# Patient Record
Sex: FEMALE | Race: WHITE | NOT HISPANIC OR LATINO | Employment: OTHER | URBAN - METROPOLITAN AREA
[De-identification: names, ages, dates, MRNs, and addresses within clinical notes are randomized per-mention and may not be internally consistent; named-entity substitution may affect disease eponyms.]

---

## 2017-01-01 ENCOUNTER — DOCUMENTATION (OUTPATIENT)
Dept: GYNECOLOGIC ONCOLOGY | Facility: CLINIC | Age: 72
End: 2017-01-01

## 2017-01-01 ENCOUNTER — TELEPHONE (OUTPATIENT)
Dept: GYNECOLOGIC ONCOLOGY | Facility: CLINIC | Age: 72
End: 2017-01-01

## 2017-01-01 ENCOUNTER — TELEPHONE (OUTPATIENT)
Dept: RADIATION ONCOLOGY | Facility: HOSPITAL | Age: 72
End: 2017-01-01

## 2017-01-01 ENCOUNTER — OFFICE VISIT (OUTPATIENT)
Dept: GYNECOLOGIC ONCOLOGY | Facility: CLINIC | Age: 72
End: 2017-01-01

## 2017-01-01 ENCOUNTER — HOSPITAL ENCOUNTER (OUTPATIENT)
Dept: GENERAL RADIOLOGY | Facility: HOSPITAL | Age: 72
Discharge: HOME | End: 2017-07-06
Attending: OBSTETRICS & GYNECOLOGY | Admitting: OBSTETRICS & GYNECOLOGY

## 2017-01-01 ENCOUNTER — APPOINTMENT (OUTPATIENT)
Dept: ONCOLOGY | Facility: HOSPITAL | Age: 72
End: 2017-01-01

## 2017-01-01 ENCOUNTER — DOCUMENTATION (OUTPATIENT)
Dept: NUTRITION | Facility: HOSPITAL | Age: 72
End: 2017-01-01

## 2017-01-01 ENCOUNTER — HOSPITAL ENCOUNTER (OUTPATIENT)
Dept: PET IMAGING | Facility: HOSPITAL | Age: 72
Discharge: HOME | End: 2017-04-20

## 2017-01-01 ENCOUNTER — OFFICE VISIT (OUTPATIENT)
Dept: FAMILY MEDICINE CLINIC | Facility: CLINIC | Age: 72
End: 2017-01-01

## 2017-01-01 ENCOUNTER — HOSPITAL ENCOUNTER (OUTPATIENT)
Dept: CT IMAGING | Facility: HOSPITAL | Age: 72
Discharge: HOME | End: 2017-12-28
Attending: OBSTETRICS & GYNECOLOGY | Admitting: OBSTETRICS & GYNECOLOGY

## 2017-01-01 ENCOUNTER — TELEPHONE (OUTPATIENT)
Dept: FAMILY MEDICINE CLINIC | Facility: CLINIC | Age: 72
End: 2017-01-01

## 2017-01-01 ENCOUNTER — LAB (OUTPATIENT)
Dept: LAB | Facility: HOSPITAL | Age: 72
End: 2017-01-01

## 2017-01-01 ENCOUNTER — INFUSION (OUTPATIENT)
Dept: ONCOLOGY | Facility: HOSPITAL | Age: 72
End: 2017-01-01

## 2017-01-01 ENCOUNTER — HOSPITAL ENCOUNTER (INPATIENT)
Facility: HOSPITAL | Age: 72
LOS: 3 days | Discharge: HOME | End: 2017-06-26
Attending: OBSTETRICS & GYNECOLOGY | Admitting: OBSTETRICS & GYNECOLOGY

## 2017-01-01 ENCOUNTER — HOSPITAL ENCOUNTER (OUTPATIENT)
Dept: CT IMAGING | Facility: HOSPITAL | Age: 72
Discharge: HOME | End: 2017-06-23
Attending: OBSTETRICS & GYNECOLOGY

## 2017-01-01 ENCOUNTER — HOSPITAL ENCOUNTER (OUTPATIENT)
Dept: CT IMAGING | Facility: HOSPITAL | Age: 72
Discharge: HOME | End: 2017-04-13
Admitting: NURSE PRACTITIONER

## 2017-01-01 ENCOUNTER — HOSPITAL ENCOUNTER (OUTPATIENT)
Dept: PET IMAGING | Facility: HOSPITAL | Age: 72
Discharge: HOME | End: 2017-04-20
Admitting: NURSE PRACTITIONER

## 2017-01-01 ENCOUNTER — HOSPITAL ENCOUNTER (OUTPATIENT)
Dept: RADIATION ONCOLOGY | Facility: HOSPITAL | Age: 72
Setting detail: RADIATION/ONCOLOGY SERIES
Discharge: HOME | End: 2017-04-26

## 2017-01-01 ENCOUNTER — OFFICE VISIT (OUTPATIENT)
Dept: RADIATION ONCOLOGY | Facility: HOSPITAL | Age: 72
End: 2017-01-01

## 2017-01-01 ENCOUNTER — APPOINTMENT (OUTPATIENT)
Dept: CT IMAGING | Facility: HOSPITAL | Age: 72
End: 2017-01-01

## 2017-01-01 VITALS
OXYGEN SATURATION: 90 % | DIASTOLIC BLOOD PRESSURE: 71 MMHG | RESPIRATION RATE: 20 BRPM | HEART RATE: 92 BPM | WEIGHT: 206 LBS | SYSTOLIC BLOOD PRESSURE: 119 MMHG | BODY MASS INDEX: 33.25 KG/M2 | TEMPERATURE: 97.8 F

## 2017-01-01 VITALS
BODY MASS INDEX: 31.47 KG/M2 | WEIGHT: 195 LBS | TEMPERATURE: 98.1 F | RESPIRATION RATE: 20 BRPM | DIASTOLIC BLOOD PRESSURE: 59 MMHG | OXYGEN SATURATION: 93 % | SYSTOLIC BLOOD PRESSURE: 115 MMHG | HEART RATE: 100 BPM

## 2017-01-01 VITALS
TEMPERATURE: 97.6 F | BODY MASS INDEX: 32.88 KG/M2 | HEART RATE: 72 BPM | HEIGHT: 66 IN | SYSTOLIC BLOOD PRESSURE: 121 MMHG | DIASTOLIC BLOOD PRESSURE: 65 MMHG | RESPIRATION RATE: 16 BRPM | OXYGEN SATURATION: 86 % | WEIGHT: 204.6 LBS

## 2017-01-01 VITALS — OXYGEN SATURATION: 96 %

## 2017-01-01 VITALS
OXYGEN SATURATION: 96 % | SYSTOLIC BLOOD PRESSURE: 99 MMHG | RESPIRATION RATE: 18 BRPM | TEMPERATURE: 98 F | HEIGHT: 66 IN | DIASTOLIC BLOOD PRESSURE: 54 MMHG | WEIGHT: 215 LBS | HEART RATE: 71 BPM | BODY MASS INDEX: 34.55 KG/M2

## 2017-01-01 VITALS
HEART RATE: 82 BPM | TEMPERATURE: 97.2 F | BODY MASS INDEX: 35.45 KG/M2 | RESPIRATION RATE: 20 BRPM | DIASTOLIC BLOOD PRESSURE: 62 MMHG | WEIGHT: 213 LBS | OXYGEN SATURATION: 90 % | SYSTOLIC BLOOD PRESSURE: 131 MMHG

## 2017-01-01 VITALS
TEMPERATURE: 97.6 F | RESPIRATION RATE: 16 BRPM | SYSTOLIC BLOOD PRESSURE: 145 MMHG | DIASTOLIC BLOOD PRESSURE: 64 MMHG | HEIGHT: 65 IN | BODY MASS INDEX: 36.15 KG/M2 | OXYGEN SATURATION: 89 % | WEIGHT: 217 LBS | HEART RATE: 77 BPM

## 2017-01-01 VITALS
RESPIRATION RATE: 20 BRPM | TEMPERATURE: 96.6 F | SYSTOLIC BLOOD PRESSURE: 156 MMHG | HEART RATE: 79 BPM | WEIGHT: 217 LBS | DIASTOLIC BLOOD PRESSURE: 71 MMHG | OXYGEN SATURATION: 92 % | BODY MASS INDEX: 36.11 KG/M2

## 2017-01-01 VITALS
DIASTOLIC BLOOD PRESSURE: 63 MMHG | HEIGHT: 65 IN | RESPIRATION RATE: 18 BRPM | BODY MASS INDEX: 36.49 KG/M2 | SYSTOLIC BLOOD PRESSURE: 119 MMHG | OXYGEN SATURATION: 96 % | HEART RATE: 72 BPM | TEMPERATURE: 97 F | WEIGHT: 219 LBS

## 2017-01-01 VITALS
SYSTOLIC BLOOD PRESSURE: 136 MMHG | BODY MASS INDEX: 36.61 KG/M2 | DIASTOLIC BLOOD PRESSURE: 73 MMHG | TEMPERATURE: 96.8 F | OXYGEN SATURATION: 90 % | RESPIRATION RATE: 26 BRPM | HEART RATE: 95 BPM | WEIGHT: 220 LBS

## 2017-01-01 VITALS
SYSTOLIC BLOOD PRESSURE: 122 MMHG | HEIGHT: 65 IN | BODY MASS INDEX: 36.32 KG/M2 | WEIGHT: 218 LBS | TEMPERATURE: 97.9 F | DIASTOLIC BLOOD PRESSURE: 84 MMHG | OXYGEN SATURATION: 95 % | HEART RATE: 81 BPM

## 2017-01-01 VITALS
OXYGEN SATURATION: 84 % | SYSTOLIC BLOOD PRESSURE: 111 MMHG | TEMPERATURE: 97.7 F | RESPIRATION RATE: 32 BRPM | HEART RATE: 93 BPM | DIASTOLIC BLOOD PRESSURE: 63 MMHG

## 2017-01-01 VITALS
WEIGHT: 205 LBS | SYSTOLIC BLOOD PRESSURE: 126 MMHG | DIASTOLIC BLOOD PRESSURE: 65 MMHG | HEART RATE: 92 BPM | TEMPERATURE: 98 F | HEIGHT: 66 IN | BODY MASS INDEX: 32.95 KG/M2 | RESPIRATION RATE: 20 BRPM

## 2017-01-01 VITALS
HEART RATE: 94 BPM | OXYGEN SATURATION: 82 % | RESPIRATION RATE: 20 BRPM | WEIGHT: 215 LBS | TEMPERATURE: 96.6 F | SYSTOLIC BLOOD PRESSURE: 129 MMHG | DIASTOLIC BLOOD PRESSURE: 66 MMHG | BODY MASS INDEX: 35.78 KG/M2

## 2017-01-01 VITALS
TEMPERATURE: 97.1 F | HEIGHT: 65 IN | SYSTOLIC BLOOD PRESSURE: 138 MMHG | DIASTOLIC BLOOD PRESSURE: 67 MMHG | OXYGEN SATURATION: 83 % | WEIGHT: 215 LBS | HEART RATE: 90 BPM | RESPIRATION RATE: 20 BRPM | BODY MASS INDEX: 35.82 KG/M2

## 2017-01-01 VITALS
BODY MASS INDEX: 35.82 KG/M2 | HEART RATE: 69 BPM | HEIGHT: 65 IN | WEIGHT: 215 LBS | RESPIRATION RATE: 18 BRPM | DIASTOLIC BLOOD PRESSURE: 58 MMHG | TEMPERATURE: 99.7 F | SYSTOLIC BLOOD PRESSURE: 119 MMHG

## 2017-01-01 DIAGNOSIS — C78.00 MALIGNANT NEOPLASM METASTATIC TO LUNG, UNSPECIFIED LATERALITY (HCC): ICD-10-CM

## 2017-01-01 DIAGNOSIS — Z99.81 REQUIRES CONTINUOUS AT HOME SUPPLEMENTAL OXYGEN: ICD-10-CM

## 2017-01-01 DIAGNOSIS — J02.9 SORE THROAT: ICD-10-CM

## 2017-01-01 DIAGNOSIS — J43.1 PANLOBULAR EMPHYSEMA (HCC): ICD-10-CM

## 2017-01-01 DIAGNOSIS — R05.9 COUGH: ICD-10-CM

## 2017-01-01 DIAGNOSIS — C56.3 MALIGNANT NEOPLASM OF BOTH OVARIES (HCC): ICD-10-CM

## 2017-01-01 DIAGNOSIS — C78.02 MALIGNANT NEOPLASM METASTATIC TO LEFT LUNG (HCC): ICD-10-CM

## 2017-01-01 DIAGNOSIS — C56.9 OVARIAN CANCER, UNSPECIFIED LATERALITY (HCC): Primary | ICD-10-CM

## 2017-01-01 DIAGNOSIS — R82.90 ABNORMAL URINALYSIS: ICD-10-CM

## 2017-01-01 DIAGNOSIS — R11.0 NAUSEA: Primary | ICD-10-CM

## 2017-01-01 DIAGNOSIS — F32.9 REACTIVE DEPRESSION: ICD-10-CM

## 2017-01-01 DIAGNOSIS — R39.89 URINARY PROBLEM: ICD-10-CM

## 2017-01-01 DIAGNOSIS — C78.02 MALIGNANT NEOPLASM METASTATIC TO LEFT LUNG (HCC): Primary | ICD-10-CM

## 2017-01-01 DIAGNOSIS — H61.22 IMPACTED CERUMEN OF LEFT EAR: ICD-10-CM

## 2017-01-01 DIAGNOSIS — K59.03 DRUG-INDUCED CONSTIPATION: ICD-10-CM

## 2017-01-01 DIAGNOSIS — J98.4 CAVITATING MASS OF UPPER LOBE OF LEFT LUNG: ICD-10-CM

## 2017-01-01 DIAGNOSIS — R09.02 HYPOXIA: Primary | ICD-10-CM

## 2017-01-01 DIAGNOSIS — I26.09 OTHER CHRONIC PULMONARY EMBOLISM WITH ACUTE COR PULMONALE (HCC): Primary | ICD-10-CM

## 2017-01-01 DIAGNOSIS — Z23 FLU VACCINE NEED: ICD-10-CM

## 2017-01-01 DIAGNOSIS — Z23 FLU VACCINE NEED: Primary | ICD-10-CM

## 2017-01-01 DIAGNOSIS — Z79.899 ON ANTINEOPLASTIC CHEMOTHERAPY: ICD-10-CM

## 2017-01-01 DIAGNOSIS — R09.02 HYPOXIA: ICD-10-CM

## 2017-01-01 DIAGNOSIS — C56.9 OVARIAN CANCER, UNSPECIFIED LATERALITY (HCC): ICD-10-CM

## 2017-01-01 DIAGNOSIS — C56.3 MALIGNANT NEOPLASM OF BOTH OVARIES (HCC): Primary | ICD-10-CM

## 2017-01-01 DIAGNOSIS — C56.9 MALIGNANT NEOPLASM OF OVARY, UNSPECIFIED LATERALITY (HCC): ICD-10-CM

## 2017-01-01 DIAGNOSIS — J43.1 PANLOBULAR EMPHYSEMA (HCC): Primary | ICD-10-CM

## 2017-01-01 DIAGNOSIS — R11.0 CHEMOTHERAPY-INDUCED NAUSEA: Primary | ICD-10-CM

## 2017-01-01 DIAGNOSIS — J44.9 CHRONIC OBSTRUCTIVE PULMONARY DISEASE, UNSPECIFIED COPD TYPE (HCC): ICD-10-CM

## 2017-01-01 DIAGNOSIS — L65.8 ALOPECIA DUE TO CYTOTOXIC DRUG: ICD-10-CM

## 2017-01-01 DIAGNOSIS — R53.0 NEOPLASTIC MALIGNANT RELATED FATIGUE: ICD-10-CM

## 2017-01-01 DIAGNOSIS — C56.9 MALIGNANT NEOPLASM OF OVARY, UNSPECIFIED LATERALITY (HCC): Primary | ICD-10-CM

## 2017-01-01 DIAGNOSIS — J02.9 SORE THROAT: Primary | ICD-10-CM

## 2017-01-01 DIAGNOSIS — Z79.01 ANTICOAGULATED BY ANTICOAGULATION TREATMENT: ICD-10-CM

## 2017-01-01 DIAGNOSIS — T45.1X5A PANCYTOPENIA DUE TO ANTINEOPLASTIC CHEMOTHERAPY (HCC): ICD-10-CM

## 2017-01-01 DIAGNOSIS — D61.810 PANCYTOPENIA DUE TO ANTINEOPLASTIC CHEMOTHERAPY (HCC): ICD-10-CM

## 2017-01-01 DIAGNOSIS — R82.90 ABNORMAL URINALYSIS: Primary | ICD-10-CM

## 2017-01-01 DIAGNOSIS — R06.00 DYSPNEA, UNSPECIFIED TYPE: ICD-10-CM

## 2017-01-01 DIAGNOSIS — E78.5 DYSLIPIDEMIA: ICD-10-CM

## 2017-01-01 DIAGNOSIS — T45.1X5A ALOPECIA DUE TO CYTOTOXIC DRUG: ICD-10-CM

## 2017-01-01 DIAGNOSIS — I26.09 OTHER CHRONIC PULMONARY EMBOLISM WITH ACUTE COR PULMONALE (HCC): ICD-10-CM

## 2017-01-01 DIAGNOSIS — G50.0 TRIGEMINAL NEURALGIA OF RIGHT SIDE OF FACE: Primary | ICD-10-CM

## 2017-01-01 DIAGNOSIS — R06.02 SHORTNESS OF BREATH: ICD-10-CM

## 2017-01-01 DIAGNOSIS — J44.9 CHRONIC OBSTRUCTIVE PULMONARY DISEASE, UNSPECIFIED COPD TYPE (HCC): Primary | ICD-10-CM
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
## 2017-01-01 DIAGNOSIS — M81.0 OSTEOPOROSIS: ICD-10-CM

## 2017-01-01 DIAGNOSIS — I27.82 OTHER CHRONIC PULMONARY EMBOLISM WITH ACUTE COR PULMONALE (HCC): Primary | ICD-10-CM

## 2017-01-01 DIAGNOSIS — I82.593 CHRONIC DEEP VEIN THROMBOSIS (DVT) OF OTHER VEIN OF BOTH LOWER EXTREMITIES (HCC): ICD-10-CM

## 2017-01-01 DIAGNOSIS — G50.0 TRIGEMINAL NEURALGIA: Primary | ICD-10-CM

## 2017-01-01 DIAGNOSIS — G50.0 TRIGEMINAL NEURALGIA OF RIGHT SIDE OF FACE: ICD-10-CM

## 2017-01-01 DIAGNOSIS — I27.82 OTHER CHRONIC PULMONARY EMBOLISM WITH ACUTE COR PULMONALE (HCC): ICD-10-CM

## 2017-01-01 DIAGNOSIS — B00.2 ORAL HERPES: Primary | ICD-10-CM

## 2017-01-01 DIAGNOSIS — R30.0 DYSURIA: Primary | ICD-10-CM
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
## 2017-01-01 DIAGNOSIS — R06.09 DYSPNEA ON EXERTION: ICD-10-CM

## 2017-01-01 DIAGNOSIS — E86.0 DEHYDRATION: ICD-10-CM

## 2017-01-01 DIAGNOSIS — K62.5 RECTAL BLEEDING: ICD-10-CM

## 2017-01-01 DIAGNOSIS — T45.1X5A CHEMOTHERAPY-INDUCED NAUSEA: Primary | ICD-10-CM

## 2017-01-01 DIAGNOSIS — R58 ECCHYMOSIS ON EXAMINATION: ICD-10-CM

## 2017-01-01 LAB
ABO + RH BLD: NORMAL
ABO + RH BLD: NORMAL
ABO GROUP BLD: NORMAL
ALBUMIN SERPL-MCNC: 3.9 G/DL (ref 3.2–4.8)
ALBUMIN SERPL-MCNC: 4 G/DL (ref 3.2–4.8)
ALBUMIN SERPL-MCNC: 4.2 G/DL (ref 3.2–4.8)
ALBUMIN SERPL-MCNC: 4.4 G/DL (ref 3.2–4.8)
ALBUMIN/GLOB SERPL: 1.3 G/DL (ref 1.5–2.5)
ALBUMIN/GLOB SERPL: 1.4 G/DL (ref 1.5–2.5)
ALP SERPL-CCNC: 69 U/L (ref 25–100)
ALP SERPL-CCNC: 73 U/L (ref 25–100)
ALP SERPL-CCNC: 81 U/L (ref 25–100)
ALP SERPL-CCNC: 85 U/L (ref 25–100)
ALT SERPL W P-5'-P-CCNC: 12 U/L (ref 7–40)
ALT SERPL W P-5'-P-CCNC: 17 U/L (ref 7–40)
ALT SERPL W P-5'-P-CCNC: 18 U/L (ref 7–40)
ALT SERPL W P-5'-P-CCNC: 20 U/L (ref 7–40)
ANION GAP SERPL CALCULATED.3IONS-SCNC: 3 MMOL/L (ref 3–11)
ANION GAP SERPL CALCULATED.3IONS-SCNC: 4 MMOL/L (ref 3–11)
ANION GAP SERPL CALCULATED.3IONS-SCNC: 4 MMOL/L (ref 3–11)
ANION GAP SERPL CALCULATED.3IONS-SCNC: 7 MMOL/L (ref 3–11)
ANION GAP SERPL CALCULATED.3IONS-SCNC: 8 MMOL/L (ref 3–11)
ANION GAP SERPL CALCULATED.3IONS-SCNC: 8 MMOL/L (ref 3–11)
AST SERPL-CCNC: 13 U/L (ref 0–33)
AST SERPL-CCNC: 20 U/L (ref 0–33)
AST SERPL-CCNC: 21 U/L (ref 0–33)
AST SERPL-CCNC: 24 U/L (ref 0–33)
BACTERIA SPEC AEROBE CULT: ABNORMAL
BACTERIA SPEC AEROBE CULT: NORMAL
BACTERIA UR QL AUTO: ABNORMAL /HPF
BASOPHILS # BLD AUTO: 0 10*3/MM3 (ref 0–0.2)
BASOPHILS # BLD AUTO: 0.01 10*3/MM3 (ref 0–0.2)
BASOPHILS # BLD MANUAL: 0 10*3/MM3 (ref 0–0.2)
BASOPHILS NFR BLD AUTO: 0 % (ref 0–1)
BASOPHILS NFR BLD AUTO: 0 % (ref 0–1)
BASOPHILS NFR BLD AUTO: 0.2 % (ref 0–1)
BH BB BLOOD EXPIRATION DATE: NORMAL
BH BB BLOOD EXPIRATION DATE: NORMAL
BH BB BLOOD TYPE BARCODE: 6200
BH BB BLOOD TYPE BARCODE: 6200
BH BB DISPENSE STATUS: NORMAL
BH BB DISPENSE STATUS: NORMAL
BH BB PRODUCT CODE: NORMAL
BH BB PRODUCT CODE: NORMAL
BH BB UNIT NUMBER: NORMAL
BH BB UNIT NUMBER: NORMAL
BILIRUB SERPL-MCNC: 0.2 MG/DL (ref 0.3–1.2)
BILIRUB SERPL-MCNC: 0.3 MG/DL (ref 0.3–1.2)
BILIRUB SERPL-MCNC: 0.3 MG/DL (ref 0.3–1.2)
BILIRUB SERPL-MCNC: 0.6 MG/DL (ref 0.3–1.2)
BILIRUB UR QL STRIP: NEGATIVE
BLD GP AB SCN SERPL QL: NEGATIVE
BUN BLD-MCNC: 14 MG/DL (ref 9–23)
BUN BLD-MCNC: 14 MG/DL (ref 9–23)
BUN BLD-MCNC: 17 MG/DL (ref 9–23)
BUN BLD-MCNC: 22 MG/DL (ref 9–23)
BUN BLD-MCNC: 27 MG/DL (ref 9–23)
BUN BLD-MCNC: 33 MG/DL (ref 9–23)
BUN/CREAT SERPL: 17.5 (ref 7–25)
BUN/CREAT SERPL: 18.9 (ref 7–25)
BUN/CREAT SERPL: 20 (ref 7–25)
BUN/CREAT SERPL: 27.5 (ref 7–25)
BUN/CREAT SERPL: 30 (ref 7–25)
BUN/CREAT SERPL: 36.7 (ref 7–25)
CA-I SERPL ISE-MCNC: 1.14 MMOL/L (ref 1.12–1.32)
CALCIUM SPEC-SCNC: 10 MG/DL (ref 8.7–10.4)
CALCIUM SPEC-SCNC: 8.5 MG/DL (ref 8.7–10.4)
CALCIUM SPEC-SCNC: 8.6 MG/DL (ref 8.7–10.4)
CALCIUM SPEC-SCNC: 9.3 MG/DL (ref 8.7–10.4)
CALCIUM SPEC-SCNC: 9.5 MG/DL (ref 8.7–10.4)
CALCIUM SPEC-SCNC: 9.8 MG/DL (ref 8.7–10.4)
CANCER AG125 SERPL QL: 21.8 U/ML (ref 0–30.2)
CANCER AG125 SERPL QL: 24.9 U/ML (ref 0–30.2)
CANCER AG125 SERPL QL: 28.6 U/ML (ref 0–30.2)
CHLORIDE SERPL-SCNC: 103 MMOL/L (ref 99–109)
CHLORIDE SERPL-SCNC: 105 MMOL/L (ref 99–109)
CHLORIDE SERPL-SCNC: 106 MMOL/L (ref 99–109)
CHLORIDE SERPL-SCNC: 107 MMOL/L (ref 99–109)
CLARITY UR: ABNORMAL
CLARITY UR: CLEAR
CO2 SERPL-SCNC: 24 MMOL/L (ref 20–31)
CO2 SERPL-SCNC: 25 MMOL/L (ref 20–31)
CO2 SERPL-SCNC: 26 MMOL/L (ref 20–31)
CO2 SERPL-SCNC: 30 MMOL/L (ref 20–31)
CO2 SERPL-SCNC: 30 MMOL/L (ref 20–31)
CO2 SERPL-SCNC: 31 MMOL/L (ref 20–31)
COD CRY URNS QL: ABNORMAL /HPF
COLOR UR: YELLOW
CREAT BLD-MCNC: 0.7 MG/DL (ref 0.6–1.3)
CREAT BLD-MCNC: 0.8 MG/DL (ref 0.6–1.3)
CREAT BLD-MCNC: 0.8 MG/DL (ref 0.6–1.3)
CREAT BLD-MCNC: 0.9 MG/DL (ref 0.6–1.3)
CREAT BLDA-MCNC: 0.7 MG/DL (ref 0.6–1.3)
CREAT BLDA-MCNC: 0.8 MG/DL (ref 0.6–1.3)
CREAT BLDA-MCNC: 0.9 MG/DL (ref 0.6–1.3)
CREAT SERPL-MCNC: 0.8 MG/DL
CROSSMATCH INTERPRETATION: NORMAL
CROSSMATCH INTERPRETATION: NORMAL
DEPRECATED RDW RBC AUTO: 69 FL (ref 37–54)
DEPRECATED RDW RBC AUTO: 69.7 FL (ref 37–54)
DEPRECATED RDW RBC AUTO: 70.5 FL (ref 37–54)
EOSINOPHIL # BLD AUTO: 0.01 10*3/MM3 (ref 0.1–0.3)
EOSINOPHIL # BLD AUTO: 0.01 10*3/MM3 (ref 0.1–0.3)
EOSINOPHIL # BLD MANUAL: 0.02 10*3/MM3 (ref 0.1–0.3)
EOSINOPHIL NFR BLD AUTO: 0.2 % (ref 0–3)
EOSINOPHIL NFR BLD AUTO: 0.9 % (ref 0–3)
EOSINOPHIL NFR BLD MANUAL: 1 % (ref 0–3)
ERYTHROCYTE [DISTWIDTH] IN BLOOD BY AUTOMATED COUNT: 16.3 % (ref 11.3–14.5)
ERYTHROCYTE [DISTWIDTH] IN BLOOD BY AUTOMATED COUNT: 17.1 % (ref 11.3–14.5)
ERYTHROCYTE [DISTWIDTH] IN BLOOD BY AUTOMATED COUNT: 19.6 % (ref 11.3–14.5)
ERYTHROCYTE [DISTWIDTH] IN BLOOD BY AUTOMATED COUNT: 19.7 % (ref 11.3–14.5)
ERYTHROCYTE [DISTWIDTH] IN BLOOD BY AUTOMATED COUNT: 19.9 % (ref 11.3–14.5)
ERYTHROCYTE [DISTWIDTH] IN BLOOD BY AUTOMATED COUNT: 20.1 % (ref 11.3–14.5)
ERYTHROCYTE [DISTWIDTH] IN BLOOD BY AUTOMATED COUNT: 21.5 % (ref 11.3–14.5)
ERYTHROCYTE [DISTWIDTH] IN BLOOD BY AUTOMATED COUNT: 23.3 % (ref 11.3–14.5)
GFR SERPL CREATININE-BSD FRML MDRD: 61 ML/MIN/1.73
GFR SERPL CREATININE-BSD FRML MDRD: 70 ML/MIN/1.73
GFR SERPL CREATININE-BSD FRML MDRD: 70 ML/MIN/1.73
GFR SERPL CREATININE-BSD FRML MDRD: 82 ML/MIN/1.73
GLOBULIN UR ELPH-MCNC: 2.9 GM/DL
GLOBULIN UR ELPH-MCNC: 2.9 GM/DL
GLOBULIN UR ELPH-MCNC: 3.1 GM/DL
GLOBULIN UR ELPH-MCNC: 3.2 GM/DL
GLUCOSE BLD-MCNC: 119 MG/DL (ref 70–100)
GLUCOSE BLD-MCNC: 125 MG/DL (ref 70–100)
GLUCOSE BLD-MCNC: 127 MG/DL (ref 70–100)
GLUCOSE BLD-MCNC: 92 MG/DL (ref 70–100)
GLUCOSE BLD-MCNC: 96 MG/DL (ref 70–100)
GLUCOSE BLD-MCNC: 98 MG/DL (ref 70–100)
GLUCOSE BLDC GLUCOMTR-MCNC: 108 MG/DL (ref 70–130)
GLUCOSE BLDC GLUCOMTR-MCNC: 150 MG/DL (ref 70–130)
GLUCOSE BLDC GLUCOMTR-MCNC: 87 MG/DL (ref 70–130)
GLUCOSE UR STRIP-MCNC: NEGATIVE MG/DL
HCT VFR BLD AUTO: 22.3 % (ref 34.5–44)
HCT VFR BLD AUTO: 27.3 % (ref 34.5–44)
HCT VFR BLD AUTO: 27.7 % (ref 34.5–44)
HCT VFR BLD AUTO: 27.8 % (ref 34.5–44)
HCT VFR BLD AUTO: 28.4 % (ref 34.5–44)
HCT VFR BLD AUTO: 31 % (ref 34.5–44)
HCT VFR BLD AUTO: 33 % (ref 34.5–44)
HCT VFR BLD AUTO: 37 % (ref 34.5–44)
HGB BLD-MCNC: 10.8 G/DL (ref 11.5–15.5)
HGB BLD-MCNC: 12.1 G/DL (ref 11.5–15.5)
HGB BLD-MCNC: 7.4 G/DL (ref 11.5–15.5)
HGB BLD-MCNC: 8.6 G/DL (ref 11.5–15.5)
HGB BLD-MCNC: 8.9 G/DL (ref 11.5–15.5)
HGB BLD-MCNC: 8.9 G/DL (ref 11.5–15.5)
HGB BLD-MCNC: 9.2 G/DL (ref 11.5–15.5)
HGB BLD-MCNC: 9.8 G/DL (ref 11.5–15.5)
HGB UR QL STRIP.AUTO: ABNORMAL
HGB UR QL STRIP.AUTO: NEGATIVE
HYALINE CASTS UR QL AUTO: ABNORMAL /LPF
IMM GRANULOCYTES # BLD: 0 10*3/MM3 (ref 0–0.03)
IMM GRANULOCYTES # BLD: 0.03 10*3/MM3 (ref 0–0.03)
IMM GRANULOCYTES NFR BLD: 0 % (ref 0–0.6)
IMM GRANULOCYTES NFR BLD: 0.6 % (ref 0–0.6)
KETONES UR QL STRIP: ABNORMAL
KETONES UR QL STRIP: NEGATIVE
LEUKOCYTE ESTERASE UR QL STRIP.AUTO: ABNORMAL
LEUKOCYTE ESTERASE UR QL STRIP.AUTO: NEGATIVE
LEUKOCYTE ESTERASE UR QL STRIP.AUTO: NEGATIVE
LYMPHOCYTES # BLD AUTO: 0.76 10*3/MM3 (ref 0.6–4.8)
LYMPHOCYTES # BLD AUTO: 0.8 10*3/MM3 (ref 0.6–4.8)
LYMPHOCYTES # BLD AUTO: 0.9 10*3/MM3 (ref 0.6–4.8)
LYMPHOCYTES # BLD AUTO: 1 10*3/MM3 (ref 0.6–4.8)
LYMPHOCYTES # BLD AUTO: 1.09 10*3/MM3 (ref 0.6–4.8)
LYMPHOCYTES # BLD AUTO: 1.4 10*3/MM3 (ref 0.6–4.8)
LYMPHOCYTES # BLD AUTO: 1.7 10*3/MM3 (ref 0.6–4.8)
LYMPHOCYTES # BLD MANUAL: 1.05 10*3/MM3 (ref 0.6–4.8)
LYMPHOCYTES NFR BLD AUTO: 13.8 % (ref 24–44)
LYMPHOCYTES NFR BLD AUTO: 14.7 % (ref 24–44)
LYMPHOCYTES NFR BLD AUTO: 14.7 % (ref 24–44)
LYMPHOCYTES NFR BLD AUTO: 22.1 % (ref 24–44)
LYMPHOCYTES NFR BLD AUTO: 27.1 % (ref 24–44)
LYMPHOCYTES NFR BLD AUTO: 71.7 % (ref 24–44)
LYMPHOCYTES NFR BLD AUTO: 75.7 % (ref 24–44)
LYMPHOCYTES NFR BLD MANUAL: 31 % (ref 0–12)
LYMPHOCYTES NFR BLD MANUAL: 44 % (ref 24–44)
MACROCYTES BLD QL SMEAR: ABNORMAL
MACROCYTES BLD QL SMEAR: NORMAL
MACROCYTES BLD QL SMEAR: NORMAL
MAGNESIUM SERPL-MCNC: 1.5 MG/DL (ref 1.3–2.7)
MAGNESIUM SERPL-MCNC: 1.7 MG/DL (ref 1.3–2.7)
MAGNESIUM SERPL-MCNC: 2 MG/DL (ref 1.3–2.7)
MCH RBC QN AUTO: 31.6 PG (ref 27–31)
MCH RBC QN AUTO: 31.8 PG (ref 27–31)
MCH RBC QN AUTO: 31.9 PG (ref 27–31)
MCH RBC QN AUTO: 31.9 PG (ref 27–31)
MCH RBC QN AUTO: 32 PG (ref 27–31)
MCH RBC QN AUTO: 32.2 PG (ref 27–31)
MCH RBC QN AUTO: 32.2 PG (ref 27–31)
MCH RBC QN AUTO: 32.6 PG (ref 27–31)
MCHC RBC AUTO-ENTMCNC: 31.5 G/DL (ref 32–36)
MCHC RBC AUTO-ENTMCNC: 31.8 G/DL (ref 32–36)
MCHC RBC AUTO-ENTMCNC: 32 G/DL (ref 32–36)
MCHC RBC AUTO-ENTMCNC: 32.1 G/DL (ref 32–36)
MCHC RBC AUTO-ENTMCNC: 32.2 G/DL (ref 32–36)
MCHC RBC AUTO-ENTMCNC: 32.6 G/DL (ref 32–36)
MCHC RBC AUTO-ENTMCNC: 32.9 G/DL (ref 32–36)
MCHC RBC AUTO-ENTMCNC: 33.3 G/DL (ref 32–36)
MCV RBC AUTO: 100 FL (ref 80–99)
MCV RBC AUTO: 100 FL (ref 80–99)
MCV RBC AUTO: 100.4 FL (ref 80–99)
MCV RBC AUTO: 101.1 FL (ref 80–99)
MCV RBC AUTO: 97 FL (ref 80–99)
MCV RBC AUTO: 97.1 FL (ref 80–99)
MCV RBC AUTO: 97.8 FL (ref 80–99)
MCV RBC AUTO: 99.7 FL (ref 80–99)
MONOCYTES # BLD AUTO: 0.1 10*3/MM3 (ref 0–1)
MONOCYTES # BLD AUTO: 0.2 10*3/MM3 (ref 0–1)
MONOCYTES # BLD AUTO: 0.2 10*3/MM3 (ref 0–1)
MONOCYTES # BLD AUTO: 0.28 10*3/MM3 (ref 0–1)
MONOCYTES # BLD AUTO: 0.3 10*3/MM3 (ref 0–1)
MONOCYTES # BLD AUTO: 0.4 10*3/MM3 (ref 0–1)
MONOCYTES # BLD AUTO: 0.74 10*3/MM3 (ref 0–1)
MONOCYTES # BLD AUTO: 1.06 10*3/MM3 (ref 0–1)
MONOCYTES NFR BLD AUTO: 2.6 % (ref 0–12)
MONOCYTES NFR BLD AUTO: 2.8 % (ref 0–12)
MONOCYTES NFR BLD AUTO: 21.5 % (ref 0–12)
MONOCYTES NFR BLD AUTO: 26.4 % (ref 0–12)
MONOCYTES NFR BLD AUTO: 3.2 % (ref 0–12)
MONOCYTES NFR BLD AUTO: 6.7 % (ref 0–12)
MONOCYTES NFR BLD AUTO: 6.9 % (ref 0–12)
MUCOUS THREADS URNS QL MICRO: ABNORMAL /HPF
NEUTROPHILS # BLD AUTO: 0.01 10*3/MM3 (ref 1.5–8.3)
NEUTROPHILS # BLD AUTO: 0.2 10*3/MM3 (ref 1.5–8.3)
NEUTROPHILS # BLD AUTO: 0.57 10*3/MM3 (ref 1.5–8.3)
NEUTROPHILS # BLD AUTO: 2.74 10*3/MM3 (ref 1.5–8.3)
NEUTROPHILS # BLD AUTO: 4.2 10*3/MM3 (ref 1.5–8.3)
NEUTROPHILS # BLD AUTO: 4.5 10*3/MM3 (ref 1.5–8.3)
NEUTROPHILS # BLD AUTO: 5.5 10*3/MM3 (ref 1.5–8.3)
NEUTROPHILS # BLD AUTO: 7.8 10*3/MM3 (ref 1.5–8.3)
NEUTROPHILS NFR BLD AUTO: 1 % (ref 41–71)
NEUTROPHILS NFR BLD AUTO: 17.4 % (ref 41–71)
NEUTROPHILS NFR BLD AUTO: 55.4 % (ref 41–71)
NEUTROPHILS NFR BLD AUTO: 66.2 % (ref 41–71)
NEUTROPHILS NFR BLD AUTO: 82.1 % (ref 41–71)
NEUTROPHILS NFR BLD AUTO: 82.5 % (ref 41–71)
NEUTROPHILS NFR BLD AUTO: 83.6 % (ref 41–71)
NEUTROPHILS NFR BLD MANUAL: 17 % (ref 41–71)
NEUTS BAND NFR BLD MANUAL: 7 % (ref 0–5)
NITRITE UR QL STRIP: NEGATIVE
NITRITE UR QL STRIP: POSITIVE
NRBC SPEC MANUAL: 2 /100 WBC (ref 0–0)
PH UR STRIP.AUTO: 6 [PH] (ref 5–8)
PH UR STRIP.AUTO: 6.5 [PH] (ref 5–8)
PH UR STRIP.AUTO: 6.5 [PH] (ref 5–8)
PH UR STRIP.AUTO: 7 [PH] (ref 5–8)
PH UR STRIP.AUTO: 7 [PH] (ref 5–8)
PH UR STRIP.AUTO: 7.5 [PH] (ref 5–8)
PLAT MORPH BLD: NORMAL
PLATELET # BLD AUTO: 117 10*3/MM3 (ref 150–450)
PLATELET # BLD AUTO: 118 10*3/MM3 (ref 150–450)
PLATELET # BLD AUTO: 154 10*3/MM3 (ref 150–450)
PLATELET # BLD AUTO: 204 10*3/MM3 (ref 150–450)
PLATELET # BLD AUTO: 250 10*3/MM3 (ref 150–450)
PLATELET # BLD AUTO: 95 10*3/MM3 (ref 150–450)
PLATELET # BLD AUTO: 96 10*3/MM3 (ref 150–450)
PLATELET # BLD AUTO: 97 10*3/MM3 (ref 150–450)
PMV BLD AUTO: 11.1 FL (ref 6–12)
PMV BLD AUTO: 11.2 FL (ref 6–12)
PMV BLD AUTO: 11.7 FL (ref 6–12)
PMV BLD AUTO: 7.1 FL (ref 6–12)
PMV BLD AUTO: 7.2 FL (ref 6–12)
PMV BLD AUTO: 7.8 FL (ref 6–12)
PMV BLD AUTO: 8.6 FL (ref 6–12)
PMV BLD AUTO: 9 FL (ref 6–12)
POTASSIUM BLD-SCNC: 4.2 MMOL/L (ref 3.5–5.5)
POTASSIUM BLD-SCNC: 4.3 MMOL/L (ref 3.5–5.5)
POTASSIUM BLD-SCNC: 4.4 MMOL/L (ref 3.5–5.5)
POTASSIUM BLD-SCNC: 4.6 MMOL/L (ref 3.5–5.5)
POTASSIUM BLD-SCNC: 5 MMOL/L (ref 3.5–5.5)
POTASSIUM BLD-SCNC: 5.3 MMOL/L (ref 3.5–5.5)
PROT SERPL-MCNC: 6.9 G/DL (ref 5.7–8.2)
PROT SERPL-MCNC: 7 G/DL (ref 5.7–8.2)
PROT SERPL-MCNC: 7.1 G/DL (ref 5.7–8.2)
PROT SERPL-MCNC: 7.6 G/DL (ref 5.7–8.2)
PROT UR QL STRIP: NEGATIVE
RBC # BLD AUTO: 2.28 10*6/MM3 (ref 3.89–5.14)
RBC # BLD AUTO: 2.7 10*6/MM3 (ref 3.89–5.14)
RBC # BLD AUTO: 2.76 10*6/MM3 (ref 3.89–5.14)
RBC # BLD AUTO: 2.78 10*6/MM3 (ref 3.89–5.14)
RBC # BLD AUTO: 2.85 10*6/MM3 (ref 3.89–5.14)
RBC # BLD AUTO: 3.1 10*6/MM3 (ref 3.89–5.14)
RBC # BLD AUTO: 3.4 10*6/MM3 (ref 3.89–5.14)
RBC # BLD AUTO: 3.82 10*6/MM3 (ref 3.89–5.14)
RBC # UR: ABNORMAL /HPF
REF LAB TEST METHOD: ABNORMAL
RH BLD: POSITIVE
S PYO AG THROAT QL: NEGATIVE
SODIUM BLD-SCNC: 136 MMOL/L (ref 132–146)
SODIUM BLD-SCNC: 137 MMOL/L (ref 132–146)
SODIUM BLD-SCNC: 139 MMOL/L (ref 132–146)
SODIUM BLD-SCNC: 139 MMOL/L (ref 132–146)
SODIUM BLD-SCNC: 140 MMOL/L (ref 132–146)
SODIUM BLD-SCNC: 140 MMOL/L (ref 132–146)
SP GR UR STRIP: 1.01 (ref 1–1.03)
SP GR UR STRIP: 1.01 (ref 1–1.03)
SP GR UR STRIP: 1.02 (ref 1–1.03)
SP GR UR STRIP: 1.02 (ref 1–1.03)
SP GR UR STRIP: 1.03 (ref 1–1.03)
SP GR UR STRIP: 1.04 (ref 1–1.03)
SQUAMOUS #/AREA URNS HPF: ABNORMAL /HPF
TOXIC GRANULATION: ABNORMAL
TOXIC GRANULATION: NORMAL
UNIT  ABO: NORMAL
UNIT  ABO: NORMAL
UNIT  RH: NORMAL
UNIT  RH: NORMAL
UROBILINOGEN UR QL STRIP: ABNORMAL
WBC MORPH BLD: NORMAL
WBC NRBC COR # BLD: 1.06 10*3/MM3 (ref 3.5–10.8)
WBC NRBC COR # BLD: 1.3 10*3/MM3 (ref 3.5–10.8)
WBC NRBC COR # BLD: 2.38 10*3/MM3 (ref 3.5–10.8)
WBC NRBC COR # BLD: 4.94 10*3/MM3 (ref 3.5–10.8)
WBC NRBC COR # BLD: 5.5 10*3/MM3 (ref 3.5–10.8)
WBC NRBC COR # BLD: 6.3 10*3/MM3 (ref 3.5–10.8)
WBC NRBC COR # BLD: 6.6 10*3/MM3 (ref 3.5–10.8)
WBC NRBC COR # BLD: 9.4 10*3/MM3 (ref 3.5–10.8)
WBC UR QL AUTO: ABNORMAL /HPF

## 2017-01-01 PROCEDURE — 82962 GLUCOSE BLOOD TEST: CPT

## 2017-01-01 PROCEDURE — 25010000002 PALONOSETRON PER 25 MCG: Performed by: OBSTETRICS & GYNECOLOGY

## 2017-01-01 PROCEDURE — 0 IOPAMIDOL 61 % SOLUTION: Performed by: OBSTETRICS & GYNECOLOGY

## 2017-01-01 PROCEDURE — 87186 SC STD MICRODIL/AGAR DIL: CPT | Performed by: OBSTETRICS & GYNECOLOGY

## 2017-01-01 PROCEDURE — 74177 CT ABD & PELVIS W/CONTRAST: CPT

## 2017-01-01 PROCEDURE — 83735 ASSAY OF MAGNESIUM: CPT | Performed by: OBSTETRICS & GYNECOLOGY

## 2017-01-01 PROCEDURE — 94799 UNLISTED PULMONARY SVC/PX: CPT

## 2017-01-01 PROCEDURE — 25010000002 HEPARIN FLUSH (PORCINE) 100 UNIT/ML SOLUTION: Performed by: OBSTETRICS & GYNECOLOGY

## 2017-01-01 PROCEDURE — 85025 COMPLETE CBC W/AUTO DIFF WBC: CPT

## 2017-01-01 PROCEDURE — 86901 BLOOD TYPING SEROLOGIC RH(D): CPT | Performed by: OBSTETRICS & GYNECOLOGY

## 2017-01-01 PROCEDURE — 69209 REMOVE IMPACTED EAR WAX UNI: CPT | Performed by: FAMILY MEDICINE

## 2017-01-01 PROCEDURE — A9552 F18 FDG: HCPCS | Performed by: NURSE PRACTITIONER

## 2017-01-01 PROCEDURE — 81001 URINALYSIS AUTO W/SCOPE: CPT | Performed by: OBSTETRICS & GYNECOLOGY

## 2017-01-01 PROCEDURE — 99232 SBSQ HOSP IP/OBS MODERATE 35: CPT | Performed by: OBSTETRICS & GYNECOLOGY

## 2017-01-01 PROCEDURE — 25010000002 DEXAMETHASONE PER 1 MG: Performed by: OBSTETRICS & GYNECOLOGY

## 2017-01-01 PROCEDURE — 25010000002 CARBOPLATIN PER 50 MG: Performed by: NURSE PRACTITIONER

## 2017-01-01 PROCEDURE — 25010000002 PEGFILGRASTIM 6 MG/0.6ML SOLUTION PREFILLED SYRINGE: Performed by: OBSTETRICS & GYNECOLOGY

## 2017-01-01 PROCEDURE — 94640 AIRWAY INHALATION TREATMENT: CPT

## 2017-01-01 PROCEDURE — 87077 CULTURE AEROBIC IDENTIFY: CPT | Performed by: OBSTETRICS & GYNECOLOGY

## 2017-01-01 PROCEDURE — 99215 OFFICE O/P EST HI 40 MIN: CPT | Performed by: OBSTETRICS & GYNECOLOGY

## 2017-01-01 PROCEDURE — 25010000002 CEFEPIME: Performed by: OBSTETRICS & GYNECOLOGY

## 2017-01-01 PROCEDURE — 25010000002 ONDANSETRON PER 1 MG: Performed by: OBSTETRICS & GYNECOLOGY

## 2017-01-01 PROCEDURE — 82565 ASSAY OF CREATININE: CPT

## 2017-01-01 PROCEDURE — 36415 COLL VENOUS BLD VENIPUNCTURE: CPT

## 2017-01-01 PROCEDURE — 99233 SBSQ HOSP IP/OBS HIGH 50: CPT | Performed by: OBSTETRICS & GYNECOLOGY

## 2017-01-01 PROCEDURE — 85007 BL SMEAR W/DIFF WBC COUNT: CPT | Performed by: OBSTETRICS & GYNECOLOGY

## 2017-01-01 PROCEDURE — 96374 THER/PROPH/DIAG INJ IV PUSH: CPT

## 2017-01-01 PROCEDURE — 96417 CHEMO IV INFUS EACH ADDL SEQ: CPT

## 2017-01-01 PROCEDURE — P9016 RBC LEUKOCYTES REDUCED: HCPCS

## 2017-01-01 PROCEDURE — 96375 TX/PRO/DX INJ NEW DRUG ADDON: CPT

## 2017-01-01 PROCEDURE — 85025 COMPLETE CBC W/AUTO DIFF WBC: CPT | Performed by: OBSTETRICS & GYNECOLOGY

## 2017-01-01 PROCEDURE — 25010000002 DOCETAXEL 20 MG/ML CONCENTRATION 4 ML VIAL: Performed by: OBSTETRICS & GYNECOLOGY

## 2017-01-01 PROCEDURE — 25010000002 BEVACIZUMAB PER 10 MG: Performed by: OBSTETRICS & GYNECOLOGY

## 2017-01-01 PROCEDURE — 87086 URINE CULTURE/COLONY COUNT: CPT | Performed by: OBSTETRICS & GYNECOLOGY

## 2017-01-01 PROCEDURE — 87880 STREP A ASSAY W/OPTIC: CPT

## 2017-01-01 PROCEDURE — 86850 RBC ANTIBODY SCREEN: CPT | Performed by: OBSTETRICS & GYNECOLOGY

## 2017-01-01 PROCEDURE — 80053 COMPREHEN METABOLIC PANEL: CPT | Performed by: OBSTETRICS & GYNECOLOGY

## 2017-01-01 PROCEDURE — 90661 CCIIV3 VAC ABX FR 0.5 ML IM: CPT | Performed by: NURSE PRACTITIONER

## 2017-01-01 PROCEDURE — 86900 BLOOD TYPING SEROLOGIC ABO: CPT | Performed by: OBSTETRICS & GYNECOLOGY

## 2017-01-01 PROCEDURE — 87040 BLOOD CULTURE FOR BACTERIA: CPT | Performed by: OBSTETRICS & GYNECOLOGY

## 2017-01-01 PROCEDURE — 86920 COMPATIBILITY TEST SPIN: CPT

## 2017-01-01 PROCEDURE — 96413 CHEMO IV INFUSION 1 HR: CPT

## 2017-01-01 PROCEDURE — 99212-NC PR NO CHARGE CBC OFFICE OUTPATIENT VISIT 10 MINUTES: Performed by: NURSE PRACTITIONER

## 2017-01-01 PROCEDURE — 94760 N-INVAS EAR/PLS OXIMETRY 1: CPT

## 2017-01-01 PROCEDURE — 96361 HYDRATE IV INFUSION ADD-ON: CPT

## 2017-01-01 PROCEDURE — 99213 OFFICE O/P EST LOW 20 MIN: CPT | Performed by: OBSTETRICS & GYNECOLOGY

## 2017-01-01 PROCEDURE — 86900 BLOOD TYPING SEROLOGIC ABO: CPT

## 2017-01-01 PROCEDURE — 36430 TRANSFUSION BLD/BLD COMPNT: CPT

## 2017-01-01 PROCEDURE — 86304 IMMUNOASSAY TUMOR CA 125: CPT | Performed by: OBSTETRICS & GYNECOLOGY

## 2017-01-01 PROCEDURE — 96523 IRRIG DRUG DELIVERY DEVICE: CPT

## 2017-01-01 PROCEDURE — 80048 BASIC METABOLIC PNL TOTAL CA: CPT | Performed by: OBSTETRICS & GYNECOLOGY

## 2017-01-01 PROCEDURE — 71020 HC CHEST PA AND LATERAL: CPT

## 2017-01-01 PROCEDURE — A9270 NON-COVERED ITEM OR SERVICE: HCPCS | Performed by: OBSTETRICS & GYNECOLOGY

## 2017-01-01 PROCEDURE — 71260 CT THORAX DX C+: CPT

## 2017-01-01 PROCEDURE — 99214 OFFICE O/P EST MOD 30 MIN: CPT | Performed by: OBSTETRICS & GYNECOLOGY

## 2017-01-01 PROCEDURE — 87081 CULTURE SCREEN ONLY: CPT

## 2017-01-01 PROCEDURE — 25010000002 CARBOPLATIN PER 50 MG: Performed by: OBSTETRICS & GYNECOLOGY

## 2017-01-01 PROCEDURE — 99238 HOSP IP/OBS DSCHRG MGMT 30/<: CPT | Performed by: OBSTETRICS & GYNECOLOGY

## 2017-01-01 PROCEDURE — 25010000002 MAGNESIUM SULFATE 2 GM/50ML SOLUTION: Performed by: OBSTETRICS & GYNECOLOGY

## 2017-01-01 PROCEDURE — 96372 THER/PROPH/DIAG INJ SC/IM: CPT

## 2017-01-01 PROCEDURE — 25010000002 LORAZEPAM PER 2 MG: Performed by: OBSTETRICS & GYNECOLOGY

## 2017-01-01 PROCEDURE — 0 IOPAMIDOL 61 % SOLUTION: Performed by: NURSE PRACTITIONER

## 2017-01-01 PROCEDURE — 82330 ASSAY OF CALCIUM: CPT | Performed by: OBSTETRICS & GYNECOLOGY

## 2017-01-01 PROCEDURE — 25010000002 INFLUENZA VAC SUBUNIT QUAD 0.5 ML SUSPENSION PREFILLED SYRINGE: Performed by: NURSE PRACTITIONER

## 2017-01-01 PROCEDURE — G0008 ADMIN INFLUENZA VIRUS VAC: HCPCS | Performed by: NURSE PRACTITIONER

## 2017-01-01 PROCEDURE — 99223 1ST HOSP IP/OBS HIGH 75: CPT | Performed by: OBSTETRICS & GYNECOLOGY

## 2017-01-01 PROCEDURE — 81003 URINALYSIS AUTO W/O SCOPE: CPT | Performed by: OBSTETRICS & GYNECOLOGY

## 2017-01-01 PROCEDURE — 63710000001 BARIUM 2 % SUSPENSION: Performed by: OBSTETRICS & GYNECOLOGY

## 2017-01-01 PROCEDURE — 96360 HYDRATION IV INFUSION INIT: CPT

## 2017-01-01 PROCEDURE — 99213 OFFICE O/P EST LOW 20 MIN: CPT | Performed by: FAMILY MEDICINE

## 2017-01-01 PROCEDURE — 96415 CHEMO IV INFUSION ADDL HR: CPT

## 2017-01-01 PROCEDURE — 0 FLUDEOXYGLUCOSE F18 SOLUTION: Performed by: NURSE PRACTITIONER

## 2017-01-01 PROCEDURE — 78815 PET IMAGE W/CT SKULL-THIGH: CPT

## 2017-01-01 PROCEDURE — G0463 HOSPITAL OUTPT CLINIC VISIT: HCPCS

## 2017-01-01 RX ORDER — LIDOCAINE 40 MG/G
CREAM TOPICAL ONCE
Status: DISCONTINUED | OUTPATIENT
Start: 2017-01-01 | End: 2017-01-01 | Stop reason: HOSPADM

## 2017-01-01 RX ORDER — IPRATROPIUM BROMIDE AND ALBUTEROL SULFATE 2.5; .5 MG/3ML; MG/3ML
3 SOLUTION RESPIRATORY (INHALATION) EVERY 6 HOURS PRN
Qty: 360 ML | Refills: 2 | Status: SHIPPED | OUTPATIENT
Start: 2017-01-01 | End: 2017-01-01 | Stop reason: SDUPTHER

## 2017-01-01 RX ORDER — LIDOCAINE AND PRILOCAINE 25; 25 MG/G; MG/G
CREAM TOPICAL AS NEEDED
Status: CANCELLED | OUTPATIENT
Start: 2017-01-01

## 2017-01-01 RX ORDER — ONDANSETRON 2 MG/ML
4 INJECTION INTRAMUSCULAR; INTRAVENOUS EVERY 6 HOURS PRN
Status: DISCONTINUED | OUTPATIENT
Start: 2017-01-01 | End: 2017-01-01 | Stop reason: HOSPADM

## 2017-01-01 RX ORDER — SODIUM CHLORIDE 0.9 % (FLUSH) 0.9 %
10 SYRINGE (ML) INJECTION AS NEEDED
Status: CANCELLED | OUTPATIENT
Start: 2017-01-01

## 2017-01-01 RX ORDER — MULTIPLE VITAMINS W/ MINERALS TAB 9MG-400MCG
1 TAB ORAL DAILY
Status: DISCONTINUED | OUTPATIENT
Start: 2017-01-01 | End: 2017-01-01

## 2017-01-01 RX ORDER — BUDESONIDE AND FORMOTEROL FUMARATE DIHYDRATE 160; 4.5 UG/1; UG/1
2 AEROSOL RESPIRATORY (INHALATION)
Status: DISCONTINUED | OUTPATIENT
Start: 2017-01-01 | End: 2017-01-01 | Stop reason: HOSPADM

## 2017-01-01 RX ORDER — SODIUM CHLORIDE 9 MG/ML
250 INJECTION, SOLUTION INTRAVENOUS ONCE
Status: CANCELLED | OUTPATIENT
Start: 2017-01-01

## 2017-01-01 RX ORDER — SODIUM CHLORIDE 0.9 % (FLUSH) 0.9 %
20 SYRINGE (ML) INJECTION AS NEEDED
Status: CANCELLED | OUTPATIENT
Start: 2017-01-01

## 2017-01-01 RX ORDER — PROMETHAZINE HYDROCHLORIDE 25 MG/1
25 TABLET ORAL EVERY 6 HOURS PRN
Qty: 30 TABLET | Refills: 5 | Status: SHIPPED | OUTPATIENT
Start: 2017-01-01 | End: 2018-01-01 | Stop reason: SDUPTHER

## 2017-01-01 RX ORDER — SODIUM CHLORIDE 0.9 % (FLUSH) 0.9 %
20 SYRINGE (ML) INJECTION AS NEEDED
Status: DISCONTINUED | OUTPATIENT
Start: 2017-01-01 | End: 2017-01-01 | Stop reason: HOSPADM

## 2017-01-01 RX ORDER — ALBUTEROL SULFATE 2.5 MG/3ML
2.5 SOLUTION RESPIRATORY (INHALATION)
Status: DISCONTINUED | OUTPATIENT
Start: 2017-01-01 | End: 2017-01-01 | Stop reason: SDUPTHER

## 2017-01-01 RX ORDER — BISACODYL 10 MG
10 SUPPOSITORY, RECTAL RECTAL DAILY PRN
Status: DISCONTINUED | OUTPATIENT
Start: 2017-01-01 | End: 2017-01-01 | Stop reason: HOSPADM

## 2017-01-01 RX ORDER — SENNA AND DOCUSATE SODIUM 50; 8.6 MG/1; MG/1
2 TABLET, FILM COATED ORAL 2 TIMES DAILY
Status: DISCONTINUED | OUTPATIENT
Start: 2017-01-01 | End: 2017-01-01 | Stop reason: HOSPADM

## 2017-01-01 RX ORDER — GABAPENTIN 300 MG/1
600 CAPSULE ORAL
Qty: 180 CAPSULE | Refills: 3 | Status: SHIPPED | OUTPATIENT
Start: 2017-01-01 | End: 2018-01-01 | Stop reason: SDUPTHER

## 2017-01-01 RX ORDER — CARBAMAZEPINE 200 MG/1
200 TABLET ORAL 4 TIMES DAILY
Qty: 120 TABLET | Refills: 11 | Status: SHIPPED | OUTPATIENT
Start: 2017-01-01 | End: 2018-01-01

## 2017-01-01 RX ORDER — SODIUM CHLORIDE 0.9 % (FLUSH) 0.9 %
10 SYRINGE (ML) INJECTION AS NEEDED
Status: DISCONTINUED | OUTPATIENT
Start: 2017-01-01 | End: 2017-01-01 | Stop reason: HOSPADM

## 2017-01-01 RX ORDER — IPRATROPIUM BROMIDE AND ALBUTEROL SULFATE 2.5; .5 MG/3ML; MG/3ML
3 SOLUTION RESPIRATORY (INHALATION)
Status: DISCONTINUED | OUTPATIENT
Start: 2017-01-01 | End: 2017-01-01 | Stop reason: HOSPADM

## 2017-01-01 RX ORDER — LEVOFLOXACIN 750 MG/1
750 TABLET ORAL EVERY 24 HOURS
Status: DISCONTINUED | OUTPATIENT
Start: 2017-01-01 | End: 2017-01-01

## 2017-01-01 RX ORDER — LIDOCAINE 40 MG/G
CREAM TOPICAL AS NEEDED
Status: DISCONTINUED | OUTPATIENT
Start: 2017-01-01 | End: 2017-01-01

## 2017-01-01 RX ORDER — ALBUTEROL SULFATE 90 UG/1
2 AEROSOL, METERED RESPIRATORY (INHALATION) EVERY 4 HOURS PRN
Qty: 1 INHALER | Refills: 3 | Status: SHIPPED | OUTPATIENT
Start: 2017-01-01 | End: 2017-01-01

## 2017-01-01 RX ORDER — NITROFURANTOIN 25; 75 MG/1; MG/1
100 CAPSULE ORAL EVERY 12 HOURS SCHEDULED
Status: DISCONTINUED | OUTPATIENT
Start: 2017-01-01 | End: 2017-01-01 | Stop reason: HOSPADM

## 2017-01-01 RX ORDER — PREGABALIN 25 MG/1
25 CAPSULE ORAL 2 TIMES DAILY
Qty: 90 CAPSULE | Refills: 3 | Status: SHIPPED | OUTPATIENT
Start: 2017-01-01 | End: 2018-01-01

## 2017-01-01 RX ORDER — MAGNESIUM SULFATE HEPTAHYDRATE 40 MG/ML
2 INJECTION, SOLUTION INTRAVENOUS ONCE
Status: COMPLETED | OUTPATIENT
Start: 2017-01-01 | End: 2017-01-01

## 2017-01-01 RX ORDER — ACETAMINOPHEN 325 MG/1
650 TABLET ORAL EVERY 6 HOURS PRN
Status: DISCONTINUED | OUTPATIENT
Start: 2017-01-01 | End: 2017-01-01 | Stop reason: HOSPADM

## 2017-01-01 RX ORDER — IPRATROPIUM BROMIDE AND ALBUTEROL SULFATE 2.5; .5 MG/3ML; MG/3ML
3 SOLUTION RESPIRATORY (INHALATION)
Qty: 360 ML | Refills: 2 | Status: CANCELLED | OUTPATIENT
Start: 2017-01-01

## 2017-01-01 RX ORDER — ONDANSETRON 4 MG/1
8 TABLET, FILM COATED ORAL 3 TIMES DAILY PRN
Status: DISCONTINUED | OUTPATIENT
Start: 2017-01-01 | End: 2017-01-01

## 2017-01-01 RX ORDER — POLYETHYLENE GLYCOL 3350 17 G/17G
17 POWDER, FOR SOLUTION ORAL DAILY PRN
Status: DISCONTINUED | OUTPATIENT
Start: 2017-01-01 | End: 2017-01-01 | Stop reason: HOSPADM

## 2017-01-01 RX ORDER — SODIUM CHLORIDE 9 MG/ML
250 INJECTION, SOLUTION INTRAVENOUS ONCE
Status: COMPLETED | OUTPATIENT
Start: 2017-01-01 | End: 2017-01-01

## 2017-01-01 RX ORDER — NITROFURANTOIN 25; 75 MG/1; MG/1
100 CAPSULE ORAL 2 TIMES DAILY
Qty: 14 CAPSULE | Refills: 0 | Status: SHIPPED | OUTPATIENT
Start: 2017-01-01 | End: 2017-01-01 | Stop reason: HOSPADM

## 2017-01-01 RX ORDER — CIPROFLOXACIN 500 MG/1
500 TABLET, FILM COATED ORAL EVERY 12 HOURS SCHEDULED
Qty: 14 TABLET | Refills: 0 | Status: SHIPPED | OUTPATIENT
Start: 2017-01-01 | End: 2018-01-01

## 2017-01-01 RX ORDER — SODIUM CHLORIDE 0.9 % (FLUSH) 0.9 %
1-10 SYRINGE (ML) INJECTION AS NEEDED
Status: DISCONTINUED | OUTPATIENT
Start: 2017-01-01 | End: 2017-01-01 | Stop reason: HOSPADM

## 2017-01-01 RX ORDER — CARBAMAZEPINE 200 MG/1
200 TABLET ORAL 4 TIMES DAILY
Status: DISCONTINUED | OUTPATIENT
Start: 2017-01-01 | End: 2017-01-01 | Stop reason: HOSPADM

## 2017-01-01 RX ORDER — FAMOTIDINE 20 MG/1
40 TABLET, FILM COATED ORAL DAILY
Status: DISCONTINUED | OUTPATIENT
Start: 2017-01-01 | End: 2017-01-01 | Stop reason: HOSPADM

## 2017-01-01 RX ORDER — CYCLOBENZAPRINE HCL 5 MG
5 TABLET ORAL 3 TIMES DAILY PRN
Qty: 30 TABLET | Refills: 1 | Status: SHIPPED | OUTPATIENT
Start: 2017-01-01 | End: 2017-01-01

## 2017-01-01 RX ORDER — LIDOCAINE AND PRILOCAINE 25; 25 MG/G; MG/G
CREAM TOPICAL SEE ADMIN INSTRUCTIONS
Qty: 30 G | Refills: 3 | Status: SHIPPED | OUTPATIENT
Start: 2017-01-01 | End: 2017-01-01

## 2017-01-01 RX ORDER — PROMETHAZINE HYDROCHLORIDE 12.5 MG/1
12.5 TABLET ORAL EVERY 6 HOURS PRN
Status: DISCONTINUED | OUTPATIENT
Start: 2017-01-01 | End: 2017-01-01 | Stop reason: HOSPADM

## 2017-01-01 RX ORDER — ATORVASTATIN CALCIUM 40 MG/1
80 TABLET, FILM COATED ORAL DAILY
Status: DISCONTINUED | OUTPATIENT
Start: 2017-01-01 | End: 2017-01-01 | Stop reason: HOSPADM

## 2017-01-01 RX ORDER — PALONOSETRON 0.05 MG/ML
0.25 INJECTION, SOLUTION INTRAVENOUS ONCE
Status: COMPLETED | OUTPATIENT
Start: 2017-01-01 | End: 2017-01-01

## 2017-01-01 RX ORDER — VALACYCLOVIR HYDROCHLORIDE 1 G/1
1000 TABLET, FILM COATED ORAL 2 TIMES DAILY
Qty: 14 TABLET | Refills: 0 | Status: SHIPPED | OUTPATIENT
Start: 2017-01-01 | End: 2017-01-01 | Stop reason: HOSPADM

## 2017-01-01 RX ORDER — DEXAMETHASONE 4 MG/1
TABLET ORAL
Qty: 12 TABLET | Refills: 5 | Status: ON HOLD | OUTPATIENT
Start: 2017-01-01 | End: 2017-01-01

## 2017-01-01 RX ORDER — NITROFURANTOIN 25; 75 MG/1; MG/1
100 CAPSULE ORAL EVERY 12 HOURS SCHEDULED
Qty: 8 CAPSULE | Refills: 0 | Status: SHIPPED | OUTPATIENT
Start: 2017-01-01 | End: 2017-01-01

## 2017-01-01 RX ORDER — IPRATROPIUM BROMIDE AND ALBUTEROL SULFATE 2.5; .5 MG/3ML; MG/3ML
3 SOLUTION RESPIRATORY (INHALATION) EVERY 6 HOURS PRN
Qty: 360 ML | Refills: 2 | Status: SHIPPED | OUTPATIENT
Start: 2017-01-01

## 2017-01-01 RX ORDER — FLUCONAZOLE 200 MG/1
200 TABLET ORAL
Status: COMPLETED | OUTPATIENT
Start: 2017-01-01 | End: 2017-01-01

## 2017-01-01 RX ORDER — LIDOCAINE AND PRILOCAINE 25; 25 MG/G; MG/G
CREAM TOPICAL SEE ADMIN INSTRUCTIONS
Status: DISCONTINUED | OUTPATIENT
Start: 2017-01-01 | End: 2017-01-01 | Stop reason: CLARIF

## 2017-01-01 RX ORDER — NITROFURANTOIN 25; 75 MG/1; MG/1
100 CAPSULE ORAL 2 TIMES DAILY
Qty: 12 CAPSULE | Refills: 0 | Status: SHIPPED | OUTPATIENT
Start: 2017-01-01 | End: 2018-01-01

## 2017-01-01 RX ORDER — SODIUM CHLORIDE 9 MG/ML
1000 INJECTION, SOLUTION INTRAVENOUS ONCE
Status: COMPLETED | OUTPATIENT
Start: 2017-01-01 | End: 2017-01-01

## 2017-01-01 RX ORDER — DOXYCYCLINE HYCLATE 100 MG
100 TABLET ORAL 2 TIMES DAILY
Qty: 14 TABLET | Refills: 0 | Status: SHIPPED | OUTPATIENT
Start: 2017-01-01 | End: 2017-01-01 | Stop reason: HOSPADM

## 2017-01-01 RX ORDER — DOCUSATE SODIUM 250 MG
250 CAPSULE ORAL 2 TIMES DAILY PRN
Qty: 60 CAPSULE | Refills: 2 | Status: SHIPPED | OUTPATIENT
Start: 2017-01-01 | End: 2017-01-01 | Stop reason: SDUPTHER

## 2017-01-01 RX ORDER — DOCUSATE SODIUM 100 MG/1
200 CAPSULE, LIQUID FILLED ORAL 2 TIMES DAILY PRN
Status: DISCONTINUED | OUTPATIENT
Start: 2017-01-01 | End: 2017-01-01 | Stop reason: HOSPADM

## 2017-01-01 RX ORDER — TRAMADOL HYDROCHLORIDE 50 MG/1
50 TABLET ORAL EVERY 8 HOURS PRN
Status: DISCONTINUED | OUTPATIENT
Start: 2017-01-01 | End: 2017-01-01 | Stop reason: HOSPADM

## 2017-01-01 RX ORDER — GABAPENTIN 300 MG/1
600 CAPSULE ORAL 3 TIMES DAILY
Qty: 180 CAPSULE | Refills: 1 | Status: SHIPPED | OUTPATIENT
Start: 2017-01-01 | End: 2017-01-01 | Stop reason: SDUPTHER

## 2017-01-01 RX ORDER — PALONOSETRON 0.05 MG/ML
0.25 INJECTION, SOLUTION INTRAVENOUS ONCE
Status: CANCELLED | OUTPATIENT
Start: 2017-01-01

## 2017-01-01 RX ORDER — ONDANSETRON HYDROCHLORIDE 8 MG/1
8 TABLET, FILM COATED ORAL 3 TIMES DAILY PRN
Qty: 30 TABLET | Refills: 5 | Status: SHIPPED | OUTPATIENT
Start: 2017-01-01 | End: 2017-01-01

## 2017-01-01 RX ORDER — POLYETHYLENE GLYCOL 3350 17 G/17G
17 POWDER, FOR SOLUTION ORAL 2 TIMES DAILY PRN
Qty: 765 G | Refills: 3 | Status: SHIPPED | OUTPATIENT
Start: 2017-01-01

## 2017-01-01 RX ORDER — TRAMADOL HYDROCHLORIDE 50 MG/1
TABLET ORAL
Qty: 30 TABLET | Refills: 3 | OUTPATIENT
Start: 2017-01-01

## 2017-01-01 RX ORDER — SODIUM CHLORIDE 9 MG/ML
1000 INJECTION, SOLUTION INTRAVENOUS ONCE
Status: CANCELLED
Start: 2017-01-01 | End: 2017-01-01

## 2017-01-01 RX ORDER — CETIRIZINE HYDROCHLORIDE 10 MG/1
10 TABLET ORAL DAILY
Status: DISCONTINUED | OUTPATIENT
Start: 2017-01-01 | End: 2017-01-01 | Stop reason: HOSPADM

## 2017-01-01 RX ORDER — LORATADINE 10 MG/1
TABLET ORAL
Qty: 12 TABLET | Refills: 1 | Status: SHIPPED | OUTPATIENT
Start: 2017-01-01 | End: 2017-01-01

## 2017-01-01 RX ORDER — CIPROFLOXACIN 500 MG/1
500 TABLET, FILM COATED ORAL 2 TIMES DAILY
Qty: 14 TABLET | Refills: 0 | Status: SHIPPED | OUTPATIENT
Start: 2017-01-01 | End: 2017-01-01 | Stop reason: HOSPADM

## 2017-01-01 RX ORDER — TRAMADOL HYDROCHLORIDE 50 MG/1
TABLET ORAL
Qty: 30 TABLET | Refills: 0 | Status: SHIPPED | OUTPATIENT
Start: 2017-01-01 | End: 2017-01-01 | Stop reason: SDUPTHER

## 2017-01-01 RX ORDER — LORAZEPAM 2 MG/ML
0.25 INJECTION INTRAMUSCULAR EVERY 12 HOURS PRN
Status: DISCONTINUED | OUTPATIENT
Start: 2017-01-01 | End: 2017-01-01 | Stop reason: HOSPADM

## 2017-01-01 RX ORDER — TRAMADOL HYDROCHLORIDE 50 MG/1
1 TABLET ORAL
Refills: 0 | COMMUNITY
Start: 2017-01-01 | End: 2017-01-01 | Stop reason: SDUPTHER

## 2017-01-01 RX ORDER — ALENDRONATE SODIUM 70 MG/1
70 TABLET ORAL WEEKLY
Qty: 4 TABLET | Refills: 11 | Status: SHIPPED | OUTPATIENT
Start: 2017-01-01

## 2017-01-01 RX ORDER — OXYCODONE HYDROCHLORIDE 5 MG/1
TABLET ORAL
Qty: 30 TABLET | Refills: 0 | Status: SHIPPED | OUTPATIENT
Start: 2017-01-01 | End: 2017-01-01

## 2017-01-01 RX ORDER — ACETAMINOPHEN 500 MG
1000 TABLET ORAL ONCE
Status: COMPLETED | OUTPATIENT
Start: 2017-01-01 | End: 2017-01-01

## 2017-01-01 RX ORDER — CALCIUM CARBONATE 200(500)MG
2 TABLET,CHEWABLE ORAL 2 TIMES DAILY PRN
Status: DISCONTINUED | OUTPATIENT
Start: 2017-01-01 | End: 2017-01-01 | Stop reason: HOSPADM

## 2017-01-01 RX ADMIN — CARBOPLATIN 460 MG: 10 INJECTION, SOLUTION INTRAVENOUS at 13:30

## 2017-01-01 RX ADMIN — CARBAMAZEPINE 200 MG: 200 TABLET ORAL at 17:43

## 2017-01-01 RX ADMIN — BUDESONIDE AND FORMOTEROL FUMARATE DIHYDRATE 2 PUFF: 160; 4.5 AEROSOL RESPIRATORY (INHALATION) at 19:16

## 2017-01-01 RX ADMIN — DEXAMETHASONE SODIUM PHOSPHATE: 4 INJECTION, SOLUTION INTRA-ARTICULAR; INTRALESIONAL; INTRAMUSCULAR; INTRAVENOUS; SOFT TISSUE at 12:55

## 2017-01-01 RX ADMIN — Medication 2 TABLET: at 17:12

## 2017-01-01 RX ADMIN — IPRATROPIUM BROMIDE AND ALBUTEROL SULFATE 3 ML: .5; 3 SOLUTION RESPIRATORY (INHALATION) at 13:55

## 2017-01-01 RX ADMIN — IOPAMIDOL 90 ML: 612 INJECTION, SOLUTION INTRAVENOUS at 14:45

## 2017-01-01 RX ADMIN — LEVOFLOXACIN 750 MG: 750 TABLET, FILM COATED ORAL at 17:43

## 2017-01-01 RX ADMIN — DOCETAXEL 155 MG: 20 INJECTION, SOLUTION, CONCENTRATE INTRAVENOUS at 12:07

## 2017-01-01 RX ADMIN — CARBAMAZEPINE 200 MG: 200 TABLET ORAL at 21:16

## 2017-01-01 RX ADMIN — FLUCONAZOLE 200 MG: 200 TABLET ORAL at 08:39

## 2017-01-01 RX ADMIN — Medication 2 TABLET: at 08:38

## 2017-01-01 RX ADMIN — CETIRIZINE HYDROCHLORIDE 10 MG: 10 TABLET, FILM COATED ORAL at 09:10

## 2017-01-01 RX ADMIN — Medication 10 ML: at 15:00

## 2017-01-01 RX ADMIN — HEPARIN 500 UNITS: 100 SYRINGE at 15:47

## 2017-01-01 RX ADMIN — CARBAMAZEPINE 200 MG: 200 TABLET ORAL at 17:12

## 2017-01-01 RX ADMIN — IOPAMIDOL 95 ML: 612 INJECTION, SOLUTION INTRAVENOUS at 12:10

## 2017-01-01 RX ADMIN — FLUCONAZOLE 200 MG: 200 TABLET ORAL at 12:29

## 2017-01-01 RX ADMIN — CETIRIZINE HYDROCHLORIDE 10 MG: 10 TABLET, FILM COATED ORAL at 08:38

## 2017-01-01 RX ADMIN — PALONOSETRON HYDROCHLORIDE 0.25 MG: 0.25 INJECTION INTRAVENOUS at 11:54

## 2017-01-01 RX ADMIN — RIVAROXABAN 15 MG: 15 TABLET, FILM COATED ORAL at 17:43

## 2017-01-01 RX ADMIN — ACETAMINOPHEN 1000 MG: 500 TABLET ORAL at 20:41

## 2017-01-01 RX ADMIN — HEPARIN 500 UNITS: 100 SYRINGE at 14:08

## 2017-01-01 RX ADMIN — ATORVASTATIN CALCIUM 80 MG: 40 TABLET, FILM COATED ORAL at 08:44

## 2017-01-01 RX ADMIN — Medication 2 TABLET: at 20:50

## 2017-01-01 RX ADMIN — LEVOFLOXACIN 750 MG: 750 TABLET, FILM COATED ORAL at 17:12

## 2017-01-01 RX ADMIN — CEFEPIME 2 G: 2 INJECTION, POWDER, FOR SOLUTION INTRAVENOUS at 21:16

## 2017-01-01 RX ADMIN — CARBAMAZEPINE 200 MG: 200 TABLET ORAL at 09:09

## 2017-01-01 RX ADMIN — SODIUM CHLORIDE 250 ML: 9 INJECTION, SOLUTION INTRAVENOUS at 11:53

## 2017-01-01 RX ADMIN — BUDESONIDE AND FORMOTEROL FUMARATE DIHYDRATE 2 PUFF: 160; 4.5 AEROSOL RESPIRATORY (INHALATION) at 19:49

## 2017-01-01 RX ADMIN — CEFEPIME 2 G: 2 INJECTION, POWDER, FOR SOLUTION INTRAVENOUS at 17:40

## 2017-01-01 RX ADMIN — Medication 10 ML: at 15:33

## 2017-01-01 RX ADMIN — IPRATROPIUM BROMIDE AND ALBUTEROL SULFATE 3 ML: .5; 3 SOLUTION RESPIRATORY (INHALATION) at 19:45

## 2017-01-01 RX ADMIN — CARBOPLATIN 460 MG: 10 INJECTION, SOLUTION INTRAVENOUS at 12:32

## 2017-01-01 RX ADMIN — MULTIPLE VITAMINS W/ MINERALS TAB: TAB ORAL at 17:43

## 2017-01-01 RX ADMIN — IOPAMIDOL 95 ML: 612 INJECTION, SOLUTION INTRAVENOUS at 12:30

## 2017-01-01 RX ADMIN — FLUCONAZOLE 200 MG: 200 TABLET ORAL at 08:45

## 2017-01-01 RX ADMIN — FAMOTIDINE 40 MG: 20 TABLET ORAL at 17:43

## 2017-01-01 RX ADMIN — CETIRIZINE HYDROCHLORIDE 10 MG: 10 TABLET, FILM COATED ORAL at 08:44

## 2017-01-01 RX ADMIN — DOCETAXEL 155 MG: 80 INJECTION, SOLUTION INTRAVENOUS at 11:24

## 2017-01-01 RX ADMIN — CARBOPLATIN 460 MG: 10 INJECTION, SOLUTION INTRAVENOUS at 13:19

## 2017-01-01 RX ADMIN — PALONOSETRON HYDROCHLORIDE 0.25 MG: 0.25 INJECTION INTRAVENOUS at 12:04

## 2017-01-01 RX ADMIN — ATORVASTATIN CALCIUM 80 MG: 40 TABLET, FILM COATED ORAL at 08:39

## 2017-01-01 RX ADMIN — CARBAMAZEPINE 200 MG: 200 TABLET ORAL at 17:42

## 2017-01-01 RX ADMIN — IPRATROPIUM BROMIDE AND ALBUTEROL SULFATE 3 ML: .5; 3 SOLUTION RESPIRATORY (INHALATION) at 07:28

## 2017-01-01 RX ADMIN — IPRATROPIUM BROMIDE AND ALBUTEROL SULFATE 3 ML: .5; 3 SOLUTION RESPIRATORY (INHALATION) at 07:00

## 2017-01-01 RX ADMIN — NITROFURANTOIN (MONOHYDRATE/MACROCRYSTALS) 100 MG: 75; 25 CAPSULE ORAL at 11:51

## 2017-01-01 RX ADMIN — RIVAROXABAN 15 MG: 15 TABLET, FILM COATED ORAL at 09:09

## 2017-01-01 RX ADMIN — IPRATROPIUM BROMIDE AND ALBUTEROL SULFATE 3 ML: .5; 3 SOLUTION RESPIRATORY (INHALATION) at 13:43

## 2017-01-01 RX ADMIN — IPRATROPIUM BROMIDE AND ALBUTEROL SULFATE 3 ML: .5; 3 SOLUTION RESPIRATORY (INHALATION) at 09:18

## 2017-01-01 RX ADMIN — Medication 20 ML: at 13:07

## 2017-01-01 RX ADMIN — PALONOSETRON HYDROCHLORIDE 0.25 MG: 0.25 INJECTION INTRAVENOUS at 09:38

## 2017-01-01 RX ADMIN — INFLUENZA A VIRUS A/BRISBANE/10/2010 (H1N1) ANTIGEN (MDCK CELL DERIVED, PROPIOLACTONE INACTIVATED), INFLUENZA A VIRUS A/HONG KONG/4801/2014 (H3N2) ANTIGEN (MDCK CELL DERIVED, PROPIOLACTONE INACTIVATED), INFLUENZA B VIRUS B/UTAH/9/2014 ANTIGEN (MDCK CELL DERIVED, PROPIOLACTONE INACTIVATED) AND INFLUENZA B VIRUS B/HONG KONG/259/2010 ANTIGEN (MDCK CELL DERIVED, PROPIOLACTONE INACTIVATED) 0.5 ML: 15; 15; 15; 15 INJECTION, SUSPENSION INTRAMUSCULAR at 16:06

## 2017-01-01 RX ADMIN — HEPARIN 500 UNITS: 100 SYRINGE at 15:00

## 2017-01-01 RX ADMIN — BUDESONIDE AND FORMOTEROL FUMARATE DIHYDRATE 2 PUFF: 160; 4.5 AEROSOL RESPIRATORY (INHALATION) at 07:00

## 2017-01-01 RX ADMIN — CARBAMAZEPINE 200 MG: 200 TABLET ORAL at 11:51

## 2017-01-01 RX ADMIN — ATORVASTATIN CALCIUM 80 MG: 40 TABLET, FILM COATED ORAL at 17:43

## 2017-01-01 RX ADMIN — MAGNESIUM SULFATE HEPTAHYDRATE 2 G: 40 INJECTION, SOLUTION INTRAVENOUS at 14:14

## 2017-01-01 RX ADMIN — Medication 20 ML: at 14:07

## 2017-01-01 RX ADMIN — CEFEPIME 2 G: 2 INJECTION, POWDER, FOR SOLUTION INTRAVENOUS at 04:18

## 2017-01-01 RX ADMIN — SODIUM CHLORIDE 1000 ML/HR: 9 INJECTION, SOLUTION INTRAVENOUS at 12:50

## 2017-01-01 RX ADMIN — ONDANSETRON 4 MG: 2 INJECTION INTRAMUSCULAR; INTRAVENOUS at 20:53

## 2017-01-01 RX ADMIN — HEPARIN 500 UNITS: 100 SYRINGE at 13:54

## 2017-01-01 RX ADMIN — BUDESONIDE AND FORMOTEROL FUMARATE DIHYDRATE 2 PUFF: 160; 4.5 AEROSOL RESPIRATORY (INHALATION) at 21:38

## 2017-01-01 RX ADMIN — BUDESONIDE AND FORMOTEROL FUMARATE DIHYDRATE 2 PUFF: 160; 4.5 AEROSOL RESPIRATORY (INHALATION) at 07:28

## 2017-01-01 RX ADMIN — PEGFILGRASTIM 6 MG: 6 INJECTION SUBCUTANEOUS at 14:09

## 2017-01-01 RX ADMIN — IPRATROPIUM BROMIDE AND ALBUTEROL SULFATE 3 ML: .5; 3 SOLUTION RESPIRATORY (INHALATION) at 19:15

## 2017-01-01 RX ADMIN — ATORVASTATIN CALCIUM 80 MG: 40 TABLET, FILM COATED ORAL at 09:09

## 2017-01-01 RX ADMIN — LORAZEPAM 0.25 MG: 2 INJECTION INTRAMUSCULAR; INTRAVENOUS at 14:21

## 2017-01-01 RX ADMIN — CARBAMAZEPINE 200 MG: 200 TABLET ORAL at 11:04

## 2017-01-01 RX ADMIN — CEFEPIME 2 G: 2 INJECTION, POWDER, FOR SOLUTION INTRAVENOUS at 04:03

## 2017-01-01 RX ADMIN — HEPARIN 500 UNITS: 100 SYRINGE at 15:33

## 2017-01-01 RX ADMIN — BARIUM SULFATE 450 ML: 21 SUSPENSION ORAL at 10:30

## 2017-01-01 RX ADMIN — CARBAMAZEPINE 200 MG: 200 TABLET ORAL at 20:41

## 2017-01-01 RX ADMIN — CETIRIZINE HYDROCHLORIDE 10 MG: 10 TABLET, FILM COATED ORAL at 17:44

## 2017-01-01 RX ADMIN — BUDESONIDE AND FORMOTEROL FUMARATE DIHYDRATE 2 PUFF: 160; 4.5 AEROSOL RESPIRATORY (INHALATION) at 09:18

## 2017-01-01 RX ADMIN — CARBAMAZEPINE 200 MG: 200 TABLET ORAL at 20:51

## 2017-01-01 RX ADMIN — Medication 2 TABLET: at 08:44

## 2017-01-01 RX ADMIN — BEVACIZUMAB 1480 MG: 400 INJECTION, SOLUTION INTRAVENOUS at 09:44

## 2017-01-01 RX ADMIN — SODIUM CHLORIDE 250 ML: 9 INJECTION, SOLUTION INTRAVENOUS at 12:05

## 2017-01-01 RX ADMIN — CEFEPIME 2 G: 2 INJECTION, POWDER, FOR SOLUTION INTRAVENOUS at 20:52

## 2017-01-01 RX ADMIN — IPRATROPIUM BROMIDE AND ALBUTEROL SULFATE 3 ML: .5; 3 SOLUTION RESPIRATORY (INHALATION) at 21:37

## 2017-01-01 RX ADMIN — CEFEPIME 2 G: 2 INJECTION, POWDER, FOR SOLUTION INTRAVENOUS at 04:22

## 2017-01-01 RX ADMIN — RIVAROXABAN 15 MG: 15 TABLET, FILM COATED ORAL at 08:45

## 2017-01-01 RX ADMIN — Medication 2 TABLET: at 09:09

## 2017-01-01 RX ADMIN — CARBAMAZEPINE 200 MG: 200 TABLET ORAL at 12:29

## 2017-01-01 RX ADMIN — Medication 10 ML: at 15:47

## 2017-01-01 RX ADMIN — FAMOTIDINE 40 MG: 20 TABLET ORAL at 08:38

## 2017-01-01 RX ADMIN — SODIUM CHLORIDE 250 ML: 9 INJECTION, SOLUTION INTRAVENOUS at 09:23

## 2017-01-01 RX ADMIN — CARBAMAZEPINE 200 MG: 200 TABLET ORAL at 08:45

## 2017-01-01 RX ADMIN — RIVAROXABAN 15 MG: 15 TABLET, FILM COATED ORAL at 08:38

## 2017-01-01 RX ADMIN — FAMOTIDINE 40 MG: 20 TABLET ORAL at 08:44

## 2017-01-01 RX ADMIN — CEFEPIME 2 G: 2 INJECTION, POWDER, FOR SOLUTION INTRAVENOUS at 11:51

## 2017-01-01 RX ADMIN — HEPARIN 500 UNITS: 100 SYRINGE at 13:08

## 2017-01-01 RX ADMIN — CEFEPIME 2 G: 2 INJECTION, POWDER, FOR SOLUTION INTRAVENOUS at 11:04

## 2017-01-01 RX ADMIN — FAMOTIDINE 40 MG: 20 TABLET ORAL at 09:09

## 2017-01-01 RX ADMIN — CARBAMAZEPINE 200 MG: 200 TABLET ORAL at 08:38

## 2017-01-01 RX ADMIN — FLUDEOXYGLUCOSE F18 1 DOSE: 300 INJECTION INTRAVENOUS at 09:09

## 2017-01-01 RX ADMIN — CARBAMAZEPINE 200 MG: 200 TABLET ORAL at 17:08

## 2017-01-01 RX ADMIN — DOCETAXEL 155 MG: 80 INJECTION, SOLUTION INTRAVENOUS at 12:17

## 2017-01-01 RX ADMIN — Medication 2 TABLET: at 17:43

## 2017-01-01 RX ADMIN — CEFEPIME 2 G: 2 INJECTION, POWDER, FOR SOLUTION INTRAVENOUS at 12:28

## 2017-01-01 RX ADMIN — HEPARIN 500 UNITS: 100 SYRINGE at 17:08

## 2017-01-16 ENCOUNTER — HOSPITAL ENCOUNTER (OUTPATIENT)
Dept: PET IMAGING | Facility: HOSPITAL | Age: 72
Discharge: HOME | End: 2017-01-16
Attending: RADIOLOGY

## 2017-01-16 ENCOUNTER — HOSPITAL ENCOUNTER (OUTPATIENT)
Dept: PET IMAGING | Facility: HOSPITAL | Age: 72
Discharge: HOME | End: 2017-01-16
Attending: RADIOLOGY | Admitting: RADIOLOGY

## 2017-01-16 DIAGNOSIS — C78.02 MALIGNANT NEOPLASM METASTATIC TO LEFT LUNG (HCC): ICD-10-CM

## 2017-01-16 LAB — GLUCOSE BLDC GLUCOMTR-MCNC: 95 MG/DL (ref 70–130)

## 2017-01-16 PROCEDURE — 82962 GLUCOSE BLOOD TEST: CPT

## 2017-01-16 PROCEDURE — 0 FLUDEOXYGLUCOSE F18 SOLUTION: Performed by: RADIOLOGY

## 2017-01-16 PROCEDURE — 78815 PET IMAGE W/CT SKULL-THIGH: CPT

## 2017-01-16 PROCEDURE — A9552 F18 FDG: HCPCS | Performed by: RADIOLOGY

## 2017-01-16 RX ADMIN — FLUDEOXYGLUCOSE F18 1 DOSE: 300 INJECTION INTRAVENOUS at 09:11

## 2017-01-17 ENCOUNTER — TELEPHONE (OUTPATIENT)
Dept: RADIATION ONCOLOGY | Facility: HOSPITAL | Age: 72
End: 2017-01-17

## 2017-01-18 ENCOUNTER — APPOINTMENT (OUTPATIENT)
Dept: ONCOLOGY | Facility: HOSPITAL | Age: 72
End: 2017-01-18

## 2017-01-25 ENCOUNTER — INFUSION (OUTPATIENT)
Dept: ONCOLOGY | Facility: HOSPITAL | Age: 72
End: 2017-01-25

## 2017-01-25 ENCOUNTER — OFFICE VISIT (OUTPATIENT)
Dept: GYNECOLOGIC ONCOLOGY | Facility: CLINIC | Age: 72
End: 2017-01-25

## 2017-01-25 VITALS
DIASTOLIC BLOOD PRESSURE: 80 MMHG | WEIGHT: 221 LBS | HEART RATE: 84 BPM | SYSTOLIC BLOOD PRESSURE: 122 MMHG | TEMPERATURE: 97.4 F | OXYGEN SATURATION: 98 % | BODY MASS INDEX: 36.78 KG/M2

## 2017-01-25 DIAGNOSIS — C78.02 MALIGNANT NEOPLASM METASTATIC TO LEFT LUNG (HCC): ICD-10-CM

## 2017-01-25 DIAGNOSIS — Z23 FLU VACCINE NEED: Primary | ICD-10-CM

## 2017-01-25 DIAGNOSIS — C56.3 MALIGNANT NEOPLASM OF BOTH OVARIES (HCC): Primary | ICD-10-CM

## 2017-01-25 DIAGNOSIS — C56.3 MALIGNANT NEOPLASM OF BOTH OVARIES (HCC): ICD-10-CM

## 2017-01-25 LAB — CANCER AG125 SERPL QL: 10.6 U/ML (ref 0–30.2)

## 2017-01-25 PROCEDURE — 99213 OFFICE O/P EST LOW 20 MIN: CPT | Performed by: NURSE PRACTITIONER

## 2017-01-25 PROCEDURE — 25010000002 HEPARIN FLUSH (PORCINE) 100 UNIT/ML SOLUTION: Performed by: OBSTETRICS & GYNECOLOGY

## 2017-01-25 PROCEDURE — 36591 DRAW BLOOD OFF VENOUS DEVICE: CPT

## 2017-01-25 RX ORDER — SODIUM CHLORIDE 0.9 % (FLUSH) 0.9 %
20 SYRINGE (ML) INJECTION AS NEEDED
Status: DISCONTINUED | OUTPATIENT
Start: 2017-01-25 | End: 2017-01-25 | Stop reason: HOSPADM

## 2017-01-25 RX ORDER — LIDOCAINE AND PRILOCAINE 25; 25 MG/G; MG/G
CREAM TOPICAL AS NEEDED
Status: DISCONTINUED | OUTPATIENT
Start: 2017-01-25 | End: 2017-01-25 | Stop reason: HOSPADM

## 2017-01-25 RX ORDER — SODIUM CHLORIDE 0.9 % (FLUSH) 0.9 %
20 SYRINGE (ML) INJECTION AS NEEDED
Status: CANCELLED | OUTPATIENT
Start: 2017-01-25

## 2017-01-25 RX ORDER — LIDOCAINE AND PRILOCAINE 25; 25 MG/G; MG/G
CREAM TOPICAL AS NEEDED
Status: CANCELLED | OUTPATIENT
Start: 2017-01-25

## 2017-01-25 RX ORDER — PSEUDOEPHEDRINE HYDROCHLORIDE 120 MG/1
120 TABLET, FILM COATED, EXTENDED RELEASE ORAL EVERY 12 HOURS
Qty: 60 TABLET | Refills: 2 | Status: SHIPPED | OUTPATIENT
Start: 2017-01-25 | End: 2017-01-01

## 2017-01-25 RX ADMIN — Medication 20 ML: at 14:34

## 2017-01-25 RX ADMIN — HEPARIN 500 UNITS: 100 SYRINGE at 14:35

## 2017-01-25 NOTE — PROGRESS NOTES
GYN ONCOLOGY CANCER SURVEILLANCE FOLLOW-UP    Karine Eduardo  [id number]  1945    Chief Complaint: Ovarian Cancer and Med Refill (Sudogest)        History of present illness:  Karine Eduardo is a 71 y.o. year old female who is here today for ongoing surveillance of Ovarian Cancer, see Cancer History. She recently completed her post-CyberKnife PET scan and results were reported to her by Dr. Lomeli. Final read revealed enlargement of the left lung mass, however, this was clinically correlated to be consistent with her recent treatment with margin. There was also a very small right lung mass that was very small and not hypermetabolic. Decision was made for observation. Case has also been discussed with Dr. Saavedra and she is in agreement with this plan.   Patient has initiated primary care with Dr. Chaudhari, whom she is very pleased with. He prescribed her Sudogest, which she states is very helpful when she becomes congested. She requests a refill of this medication today. Overall, she is very pleased with her outcomes and is feeling well today. She is in very good spirits today and accompanied by her daughter. She denies vaginal bleeding, pelvic pain, fullness/bloating, changes in bowel or bladder function, and new respiratory concerns. She is scheduled for a port flush and repeat CA-125 level following our visit today.      Cancer History:      Malignant neoplasm of both ovaries    12/16/2014 Imaging    TVUS for PMB showed mildly enlarged uterus, 18 mm endometrial stripe, and 14 cm right adnexal mass      1/19/2015 Surgery    RTLH/BSO with infracolic omentectomy, peritoneal biopsies, and appendectomy. 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 Stage IC grade 1 by final path.       - 5/20/2015 Chemotherapy    Cycle #5 of Carboplatin and dose dense paclitaxel. Chemo course significant for bone marrow  suppression requiring transfusion and GC SF, facial trigeminal neuropathy exacerbation, and pulmonary emboli.       6/22/2015 Genetic Testing    All genes on GYNplus Panel through [facility] negative      7/9/2015 Imaging    CT scan negative for disease      7/19/2016 Imaging    CT chest/abdomen/pelvis for new GI complaints. Abdomen/pelvis negative, however, discovery of ill-defined new pulmonary mass in left upper lobe. Note: patient had recent hospitalization for suspected pneumonia      8/16/2016 Biopsy    Left upper lobe transbronchial biopsy. Pathology consistent with recurrent mucinous adenocarcinoma.       9/26/2016 -  Radiation    Underwent CyberKnife x 1 treatment with Dr. Mariann Lomeli         Past Medical History   Diagnosis Date   • Anxiety    • Arthritis    • CHF (congestive heart failure)    • Colitis    • COPD (chronic obstructive pulmonary disease)    • Depression    • Diverticulitis    • Diverticulosis    • Emphysema lung    • Fractures    • H/O bladder problems    • H/O CT scan of chest 06/09/2015     CTA of the chest 6/09/2015, large acute pulmonary emboli, bilateral. RV dilatation, but hemodynamically stable.   • H/O echocardiogram 06/09/2015     Est EF 55-60%. RV moderately dilated. All valves are structurally and functionally normal with no hemodynamically significant valve disease   • Heart murmur    • History of blood clots 06/2015     IN LEFT LEG AND BILATERAL LUNGS, TREATED WITH XARELTO    • History of transfusion    • Hypercholesteremia    • Ovarian cancer 01/2015     15 CHEMO TREATMENTS. 6/9/2015: Patient due for cycle 6 of chemo today however will be held secondary to patient's current pulmonary status.   • Pneumonia    • PONV (postoperative nausea and vomiting)    • Pulmonary emboli      Bliateral. Patient is on heparin drip and is on oxygen to keep oxygen saturations greater than 92%. Causing acute shortness of air.   • Staph infection      IN BLOOD STREAM HOSPITALIZED FROM MAY  19-JUNE2,2016, TREATED WITH ABX-VANCOMYCIN   • Stomach problems    • Stroke 1973     MILD WEAKNESS OF LEFT SIDE. History of CVA in 1970s with residual left hand numbness.   • Trigeminal neuralgia    • Urinary incontinence    • Wears dentures    • Wears glasses        Past Surgical History   Procedure Laterality Date   • Tubal abdominal ligation  1976   • Cystocele repair  1976, 8-2008,      rectocele repair   • Rectal mass excision       Mesh   • Appendectomy     • Oophorectomy Right    • Hysterectomy     • Breast surgery Right 06/2005     LUMPECTOMY    • Bladder sling modified, anterior and posterior vaginal repair       PLACED IN 2-2013, REMOVED IN 7/2013 D/T DEFECTIVE MESH AND INFECTION. Suburethral mesh sling placement and subsequent excision due to infection.   • Colonoscopy     • Teeth extraction     • Laparotomy oopherectomy     • Bronchoscopy N/A 8/16/2016     Procedure: MAURICE BRONCHOSCOPY WITH FLUORO;  Surgeon: Henrry Leo MD;  Location: [facility] ENDOSCOPY;  Service:    • Cervical conization     • Breast lumpectomy Right    • Portacath placement     • Total abdominal hysterectomy with salpingo oophorectomy     • Omentectomy       Infracolic       MEDICATIONS: The current medication list was reviewed and reconciled.     Allergies:  is allergic to bactrim [sulfamethoxazole-trimethoprim]; penicillins; prednisone; and sulfa antibiotics.    Family History   Problem Relation Age of Onset   • Kidney disease Mother    • Other Mother      heart attack   • Colon cancer Father        Last imaging study was post-CyberKnife PET scan on 1/16/2017.   Tumor marker:  Component      Latest Ref Rng 10/13/2015 1/19/2016 4/19/2016 7/19/2016 10/18/2016         0.0 - 30.2 U/mL 4.0 5.4 6.0 9.0 7.3       Review of Systems   Constitutional: Negative for appetite change, chills, fatigue, fever and unexpected weight change.   HENT: Positive for congestion (occasional).    Respiratory: Positive for cough (chronic).  Negative for shortness of breath and wheezing.    Cardiovascular: Negative for chest pain, palpitations and leg swelling.   Gastrointestinal: Negative for abdominal distention, abdominal pain, blood in stool, constipation, diarrhea, nausea and vomiting.   Endocrine: Negative.    Genitourinary: Negative for dysuria, frequency, genital sores, hematuria, pelvic pain, urgency, vaginal bleeding, vaginal discharge and vaginal pain.   Musculoskeletal: Negative for arthralgias, gait problem and joint swelling.   Neurological: Negative for dizziness, seizures, syncope, weakness, light-headedness, numbness and headaches.   Hematological: Negative for adenopathy.   Psychiatric/Behavioral: Negative.        Physical Exam  General Appearance:  alert, cooperative, no apparent distress and appears stated age   Neurologic/Psychiatric: A&O x 3, gait steady, appropriate affect   HEENT:  Normocephalic, without obvious abnormality, mucous membranes moist   Neck: Supple, symmetrical, trachea midline, no adenopathy;  No thyromegaly, masses, or tenderness   Back:   Symmetric, no curvature, ROM normal, no CVA tenderness   Lungs:   Clear to auscultation bilaterally; respirations regular, even, and unlabored bilaterally   Heart:  Regular rate and rhythm, no murmurs appreciated   Breasts:  deferred   Abdomen:   Soft, non-tender, non-distended and no organomegaly   Lymph nodes: No cervical, supraclavicular, inguinal or axillary adenopathy noted   Extremities: Normal, atraumatic; no clubbing, cyanosis, or edema    Pelvic: External Genitalia  atrophic, without lesions  Vagina  is pale, atrophic.   Vaginal Cuff  Female Vaginal Cuff: smooth, intact and without visible lesions  Uterus  surgically absent  Ovaries  surgically absent bilaterallly and without palpable masses or fullness  Parametria  smooth  Rectovaginal  Female rectovaginal: confirms no masses or bleeding     ECOG Performance Status: 1 - Symptomatic but completely  ambulatory    Procedure Note:  No notes on file      Assessment and Plan:  Karine was seen today for ovarian cancer and med refill.    Diagnoses and all orders for this visit:    Malignant neoplasm of both ovaries    Malignant neoplasm metastatic to left lung    Other orders  -     SUDOGEST 12 HOUR 120 MG 12 hr tablet; Take 1 tablet by mouth Every 12 (Twelve) Hours.        There is no evidence of disease upon today's exam. I will discuss plans for repeating imaging studies with physicians. Informed patient and daughter, likely repeat CT in 3-6 months for evaluation of lung disease. I will call them with definitive plan later this week. We will continue to follow-up with her in clinic approx every 3 months. She is understanding to call with any changes in pelvic symptoms, respiratory symptoms, or general GYN concerns.       Return in about 3 months (around 4/25/2017) for ongoing cancer surveillance.      Elaina Damon, BRIAN        Note: Speech recognition transcription software was used to dictate portions of this document.  An attempt at proofreading has been made though minor errors in transcription may still be present.  Please do not hesitate to call our office with any questions.

## 2017-01-27 DIAGNOSIS — C78.02 MALIGNANT NEOPLASM METASTATIC TO LEFT LUNG (HCC): ICD-10-CM

## 2017-01-27 DIAGNOSIS — C56.3 MALIGNANT NEOPLASM OF BOTH OVARIES (HCC): Primary | ICD-10-CM

## 2017-01-27 RX ORDER — CARBAMAZEPINE 200 MG/1
200 TABLET ORAL 4 TIMES DAILY
Qty: 120 TABLET | Refills: 2 | Status: SHIPPED | OUTPATIENT
Start: 2017-01-27 | End: 2017-01-01 | Stop reason: SDUPTHER

## 2017-02-01 ENCOUNTER — TELEPHONE (OUTPATIENT)
Dept: RADIATION ONCOLOGY | Facility: HOSPITAL | Age: 72
End: 2017-02-01

## 2017-02-09 NOTE — TELEPHONE ENCOUNTER
Karissa called wanting to know if patient needed to have a CT scan by Dr. Saavedra and PET by Dr. Lomeli.  I discussed with Dr. Lomeli and it was decided that patient would be followed by Dr. Saavedra every 3 months and that the patient would f/u with the CT scan Dr. Saavedra ordered and we would follow her as needed.  Karissa verbalized understanding and was very appreciative.

## 2017-02-10 NOTE — PROGRESS NOTES
Subjective   Karine Eduardo is a 72 y.o. female    HPI Comments: Here for recheck and refills regarding trigeminal neuralgia and osteoporosis. Needs refill on meds for these chronic problems. Interaction between Xarelto and Tegretol noted but patient has been taking since her PE without adverse effects. She relates that previous attempt to decrease her Tegretol have let to return of  Facial pain. The bene fits of treatment outweigh the risks which are reviewed with the patient today.    Complains of abnormal ear sensation which in past associated with cerumen.    Trigeminal Neuralgia   Pertinent negatives include no congestion.   Osteoporosis   Pertinent negatives include no congestion.       The following portions of the patient's history were reviewed and updated as appropriate: allergies, current medications, past social history and problem list    Review of Systems   Constitutional: Negative.    HENT: Positive for ear pain. Negative for congestion, ear discharge and hearing loss.    Respiratory: Negative.    Cardiovascular: Negative.    Gastrointestinal: Negative.    Neurological: Negative.    Hematological: Negative.    Psychiatric/Behavioral: Negative.        Objective     Vitals:    02/09/17 1537   BP: 122/84   Pulse: 81   Temp: 97.9 °F (36.6 °C)   SpO2: 95%       Physical Exam   Constitutional: She is oriented to person, place, and time. She appears well-developed and well-nourished.   HENT:   Head: Normocephalic.   Mouth/Throat: Oropharynx is clear and moist and mucous membranes are normal.   Cerumen impaction of left canal irrigated to clear today.   Eyes: Conjunctivae are normal. Pupils are equal, round, and reactive to light.   Neck: Normal range of motion. Neck supple.   Cardiovascular: Normal rate and regular rhythm.    Pulmonary/Chest: Effort normal and breath sounds normal.   Neurological: She is alert and oriented to person, place, and time.   Psychiatric: She has a normal mood and affect.  Her behavior is normal.   Nursing note and vitals reviewed.      Assessment/Plan   Problem List Items Addressed This Visit        Musculoskeletal and Integument    Osteoporosis    Relevant Medications    alendronate (FOSAMAX) 70 MG tablet      Other Visit Diagnoses     Trigeminal neuralgia    -  Primary    Relevant Medications    carBAMazepine (TEGretol) 200 MG tablet    Impacted cerumen of left ear

## 2017-02-13 PROBLEM — Z01.419 WELL WOMAN EXAM WITH ROUTINE GYNECOLOGICAL EXAM: Chronic | Status: ACTIVE | Noted: 2017-01-01

## 2017-04-13 PROBLEM — R09.02 HYPOXIA: Status: ACTIVE | Noted: 2017-01-01

## 2017-04-13 NOTE — TELEPHONE ENCOUNTER
Phoned [employer] [employer], spoke with Raman, informed him of pt needing home oxygen.  All information faxed per his request.  They will call pt to set up for delivery.

## 2017-04-13 NOTE — PROGRESS NOTES
[facility]  [id number]  1945      Reason for visit:  CT scan follow-up, lung metastasis, history of recurrent ovarian cancer, history of COPD, shortness of breath with activity  Patient initially evaluated by APRN who called me for further evaluation due to concerns about patient's clinical status today and overall clinical course.    History of present illness:  The patient is a 72 y.o. year old female who presents today for discussion of CT scan results with performed today (see oncology history).    She reports shortness of breath with minimal exertion at home.  She does have a long-term history of COPD and previous history of tobacco abuse.  She has had home oxygen in the past, but none recently.  She underwent CyberKnife due to left-sided lung metastasis.  Unfortunately, today's scan showed progression of disease.  Aside from her shortness of breath, she is feeling well.    Oncologic History:     Malignant neoplasm of both ovaries    12/16/2014 Imaging    TVUS for PMB showed mildly enlarged uterus, 18 mm endometrial stripe, and 14 cm right adnexal mass      1/19/2015 Surgery    RTLH/BSO with infracolic omentectomy, peritoneal biopsies, and appendectomy. 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 Stage IC grade 1 by final path.       - 5/20/2015 Chemotherapy    Cycle #5 of Carboplatin and dose dense paclitaxel. Chemo course significant for bone marrow suppression requiring transfusion and GC SF, facial trigeminal neuropathy exacerbation, and pulmonary emboli.       6/22/2015 Genetic Testing    All genes on GYNplus Panel through [employer] negative      7/9/2015 Imaging    CT scan negative for disease      7/19/2016 Imaging    CT chest/abdomen/pelvis for new GI complaints. Abdomen/pelvis negative, however, discovery of ill-defined new pulmonary mass in left upper lobe. Note: patient had recent  hospitalization for suspected pneumonia      8/16/2016 Biopsy    Left upper lobe transbronchial biopsy. Pathology consistent with recurrent mucinous adenocarcinoma.       9/26/2016 -  Radiation    Underwent CyberKnife x 1 treatment with Dr. Mariann Lomeli      4/13/2017 Imaging    IMPRESSION CT CHEST:  There has been significant progression of disease when  compared with 08/09/2016 specifically the left upper lobe mass noted on  the previous examination has trebled. There is also an increasing mass  in the right upper lobe, now measuring approximately 1.2 cm as well as a  mass in the right lower lobe which has also significantly increased in  size measuring 2.7 cm in size.  IMPRESSION CT ABDOMEN/PELVIS:  There is a 1.4 cm cystic mass adherent to the surface of the  inferomedial aspect of the right lobe of the liver. In retrospect this  was present on the previous examination of 08/01/2016, but not on  earlier examinations in 2015. Given the cystic nature of the original  neoplasm, this likely represents a small solitary metastasis.           Past Medical History:   Diagnosis Date   • Anxiety    • Arthritis    • CHF (congestive heart failure)    • Colitis    • COPD (chronic obstructive pulmonary disease)    • Depression    • Diverticulitis    • Diverticulosis    • Emphysema lung    • Fractures    • H/O bladder problems    • H/O CT scan of chest 06/09/2015    CTA of the chest 6/09/2015, large acute pulmonary emboli, bilateral. RV dilatation, but hemodynamically stable.   • H/O echocardiogram 06/09/2015    Est EF 55-60%. RV moderately dilated. All valves are structurally and functionally normal with no hemodynamically significant valve disease   • H/O: CVA (cerebrovascular accident)    • Heart murmur    • History of blood clots 06/2015    IN LEFT LEG AND BILATERAL LUNGS, TREATED WITH XARELTO    • History of transfusion    • Hypercholesteremia    • Osteopenia    • Ovarian cancer 01/2015    15 CHEMO TREATMENTS. 6/9/2015:  Patient due for cycle 6 of chemo today however will be held secondary to patient's current pulmonary status.   • Pneumonia    • PONV (postoperative nausea and vomiting)    • Pulmonary emboli     Bliateral. Patient is on heparin drip and is on oxygen to keep oxygen saturations greater than 92%. Causing acute shortness of air.   • Staph infection     IN BLOOD STREAM HOSPITALIZED FROM MAY 19-JUNE2,2016, TREATED WITH ABX-VANCOMYCIN   • Stomach problems    • Stroke 1973    MILD WEAKNESS OF LEFT SIDE. History of CVA in 1970s with residual left hand numbness.   • Trigeminal neuralgia    • Urinary incontinence    • Wears dentures    • Wears glasses        Past Surgical History:   Procedure Laterality Date   • APPENDECTOMY     • BLADDER SLING MODIFIED, ANTERIOR AND POSTERIOR VAGINAL REPAIR      PLACED IN 2-2013, REMOVED IN 7/2013 D/T DEFECTIVE MESH AND INFECTION. Suburethral mesh sling placement and subsequent excision due to infection.   • BREAST LUMPECTOMY Right    • BREAST SURGERY Right 06/2005    LUMPECTOMY    • BRONCHOSCOPY N/A 8/16/2016    Procedure: MAURICE BRONCHOSCOPY WITH FLUORO;  Surgeon: Henrry Leo MD;  Location: [facility] ENDOSCOPY;  Service:    • CERVICAL CONIZATION     • COLONOSCOPY     • CYSTOCELE REPAIR  1976, 8-2008,     rectocele repair   • HYSTERECTOMY     • LAPAROTOMY OOPHERECTOMY     • OMENTECTOMY      Infracolic   • OOPHORECTOMY Right    • PORTACATH PLACEMENT     • RECTAL MASS EXCISION      Mesh   • TEETH EXTRACTION     • TOTAL ABDOMINAL HYSTERECTOMY WITH SALPINGO OOPHORECTOMY     • TUBAL ABDOMINAL LIGATION  1976       MEDICATIONS: The current medication list was reviewed with the patient and updated in the EMR this date per the Medical Assistant. Medication dosages and frequencies were confirmed to be accurate.      Allergies:  is allergic to bactrim [sulfamethoxazole-trimethoprim]; penicillins; prednisone; and sulfa antibiotics.    Social History:   Social History     Social History   •  Marital status:      Spouse name: N/A   • Number of children: N/A   • Years of education: N/A     Occupational History   • Retired      Social History Main Topics   • Smoking status: Former Smoker     Types: Cigarettes     Quit date: 1997   • Smokeless tobacco: Never Used      Comment: Smoked for 20 years and smoked more than 1 PPD.   • Alcohol use No   • Drug use: No   • Sexual activity: No     Other Topics Concern   • Not on file     Social History Narrative       Family History:    Family History   Problem Relation Age of Onset   • Kidney disease Mother    • Other Mother      heart attack   • Colon cancer Father        Health Maintenance:    Health Maintenance   Topic Date Due   • TDAP/TD VACCINES (1 - Tdap) 01/28/1962   • PNEUMOCOCCAL VACCINES (65+ LOW/MEDIUM RISK) (1 of 2 - PCV13) 01/28/2008   • LIPID PANEL  12/08/2016   • DXA SCAN  12/08/2016   • COLONOSCOPY  12/08/2016   • ZOSTER VACCINE  12/08/2016   • MEDICARE ANNUAL WELLNESS  12/08/2016   • MAMMOGRAM  01/25/2019   • INFLUENZA VACCINE  Completed       Review of Systems   Constitutional: Positive for fatigue. Negative for appetite change, chills, fever and unexpected weight change.   HENT: Positive for sinus pressure. Negative for congestion, hearing loss and sore throat.    Eyes: Negative for redness and visual disturbance.   Respiratory: Positive for cough and shortness of breath. Negative for wheezing.    Cardiovascular: Negative for chest pain, palpitations and leg swelling.   Gastrointestinal: Negative for abdominal distention, abdominal pain, blood in stool, constipation, diarrhea, nausea and vomiting.   Endocrine: Negative.  Negative for cold intolerance and heat intolerance.   Genitourinary: Negative for difficulty urinating, dyspareunia, dysuria, frequency, genital sores, hematuria, pelvic pain, urgency, vaginal bleeding, vaginal discharge and vaginal pain.   Musculoskeletal: Negative for arthralgias, back pain, gait problem and joint  swelling.   Skin: Negative for rash and wound.   Allergic/Immunologic: Negative for food allergies and immunocompromised state.   Neurological: Negative for dizziness, seizures, syncope, weakness, light-headedness, numbness and headaches.   Hematological: Negative for adenopathy. Does not bruise/bleed easily.   Psychiatric/Behavioral: Negative.  Negative for confusion, dysphoric mood and sleep disturbance. The patient is not nervous/anxious.        Physical Exam     /73  Pulse 95  Temp 96.8 °F (36 °C) (Temporal Artery )  Resp 26  Wt 220 lb (99.8 kg)  SpO2 86%  BMI 36.61 kg/m2     SaO2 88% on room air 3 minutes later, up to only 90% after 5 minutes at rest.    There were no vitals filed for this visit.  There is no height or weight on file to calculate BMI.      GENERAL: Alert, well-appearing female appearing her stated age who is in no apparent distress.  She is somewhat short of breath at rest.  HEENT: Sclera anicteric. Head normocephalic, atraumatic. Mucus membranes moist.   NECK: *  BREASTS: Deferred  CARDIOVASCULAR: *  RESPIRATORY: *  GASTROINTESTINAL:  *  SKIN:  Warm, dry, well-perfused.  All visible areas intact.  No rashes, lesions, ulcers.  PSYCHIATRIC: AO x3, with appropriate somewhat sad affect, normal thought processes.  NEUROLOGIC: No focal deficits.    MUSCULOSKELETAL: Normal gait and station.   EXTREMITIES:   No cyanosis, +clubbing, symmetric.  LYMPHATICS:  *     PELVIC exam:    Deferred  * Please refer to APRN documentation.    ECOG PS 2    PROCEDURES:  none    Diagnostic Data:    Lab Results   Component Value Date     10.6 01/25/2017     7.3 10/18/2016     9.0 07/19/2016     6.0 04/19/2016     5.4 01/19/2016     4.0 10/13/2015     9.8 05/20/2015     7.0 04/21/2015     7.5 03/31/2015     5.0 03/10/2015   ]      Assessment/Plan   This is a 72 y.o. woman with a history of stage IC mucinous ovarian cancer now with multiple lung metastasis in  interval progression on imaging.    I called Dr. Betancur as Dr. Lomeli was not available at the office and discussed the CT findings.  He thinks that PET scan is indicated as there may be ongoing inflammation related to CyberKnife in the left lung which the more dramatic progression was noted in.  Further, PET/CT from 1/16/2017 showed no uptake in the right-sided lung nodules which were present at that time.  However, the CT scan from today indicates there is been increased in size and the right-sided lung nodules as well.    I discussed systemic therapy with the patient and her daughters (1 by conference call) again.  I think systemic chemotherapy make sense in the setting of multiple areas of recurrence.  Was also concern regarding the liver lesion at the periphery of the right lobe.  Patient had significant try germinal neuralgia with her chemotherapy in the past and is hesitant to try chemotherapy again due to this.  We discussed other treatment options aside from chemotherapy such as Avastin or PARP inhibitor.  Genetic tumor testing was ordered.    Due to significant hypoxia from the office including precipitous drop and oxygen saturation with minimal activity in the office, home oxygen was ordered.  I would anticipate the patient would need this lifelong.    Patient would also like to follow-up with Dr. Lomeli to discuss possibility of additional radiation to the right-sided lung lesions.    FOLLOW UP: Return for ashlyn Colon to discuss PET/CT.    This was a 40 minute long visit with 25 minutes spent in face-to-face consultation regarding treatment options for recurrent ovarian cancer, coordination of care with radiation oncology, treatment of hypoxia with home oxygen.    Note: Speech recognition transcription software was used to dictate portions of this document.  An attempt at proofreading has been made though minor errors in transcription may still be present.  Please do not hesitate to call  our office with any questions.      Electronically Signed by: Nitza Saavedra MD  Date: 4/13/2017

## 2017-04-13 NOTE — PROGRESS NOTES
"Karine Eduardo  [id number]  1945    Date of Visit: 04/13/17      Patient found to be short of air upon arrival to clinic. Oxygen saturation 86% on room air, sitting with initial vital signs. Initial VS below:  /73  Pulse 95  Temp 96.8 °F (36 °C) (Temporal Artery )   Resp 26  Wt 220 lb (99.8 kg)  SpO2 86%  BMI 36.61 kg/m2    SaO2 88% on room air 3 minutes later, up to only 90% after 5 minutes at rest. Exertion was tested. During this test initial SaO2 upon standing was 86%, continuous oxygen saturation monitored while ambulating approx 30 feet. SaO2 declined rapidly with walking and test was aborted when she reached 82% on room air and patient stated she was \"unable to walk any further without gasping\".     Dr. Saavedra notified and decision made with MD, patient, and family to initiate home oxygen therapy. She reports similar symptoms and SOA with walking throughout her home on a daily basis. She is agreeable to this plan.   [facility] will be called and office will organize this for the family.     Diagnoses:   1. Hypoxia  2. Stage IC Mucinous Ovarian Cancer  3. Lung metastasis, s/p CyberKnife, with new bilateral progression  4. New liver lesion suspicious for malignancy.       Preliminary CT scan report from earlier today reviewed. Evidence of progression of lung metastasis and possible liver met. Dr. Saavedra notified and involved in case. MD to see patient for thorough review of results and discussion of POC, transferred to MD's schedule. Daughter at bedside and other daughter involved in conversation over speaker phone.     There will be no charge for APRN encounter. See MD progress note for further details regarding cancer treatment planning.       Elaina Damon, APRN    "

## 2017-04-20 PROBLEM — J43.1 PANLOBULAR EMPHYSEMA (HCC): Status: ACTIVE | Noted: 2017-01-01

## 2017-04-20 PROBLEM — R06.00 DYSPNEA: Status: ACTIVE | Noted: 2017-01-01

## 2017-04-20 NOTE — PROGRESS NOTES
Karine VERDUZCO [facility]  [id number]  1945      Reason for visit:  PET/CT scan follow-up, lung metastasis, history of recurrent ovarian cancer, history of COPD, shortness of breath with activity    History of present illness:  The patient is a 72 y.o. year old female who presents today for discussion of PET/CT scan results performed today (see oncology history).    She again reports shortness of breath with minimal exertion at home.  She did not get home oxygen as she has no we are to keep an oxygen tank presently.She does have a long-term history of COPD and previous history of tobacco abuse. She underwent CyberKnife due to left-sided lung metastasis.  Mayra presents today for discussion of findings.  Prior to meeting with the patient and her sister, I discussed the case with Dr. Mariann Lomeli.  I personally reviewed the PET/CT images as well.  There were no liver metastasis noted.  The left-sided lung lesion had significant uptake.  The right-sided lung lesion was fairly stable.  The report noted minimal increased uptake at the right sided lung lesion.        Oncologic History:     Malignant neoplasm of both ovaries    12/16/2014 Imaging    TVUS for PMB showed mildly enlarged uterus, 18 mm endometrial stripe, and 14 cm right adnexal mass      1/19/2015 Surgery    RTLH/BSO with infracolic omentectomy, peritoneal biopsies, and appendectomy. 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 Stage IC grade 1 by final path.       - 5/20/2015 Chemotherapy    Cycle #5 of Carboplatin and dose dense paclitaxel. Chemo course significant for bone marrow suppression requiring transfusion and GC SF, facial trigeminal neuropathy exacerbation, and pulmonary emboli.       6/22/2015 Genetic Testing    All genes on GYNplus Panel through [facility] negative      7/9/2015 Imaging    CT scan negative for disease      7/19/2016 Imaging    CT  chest/abdomen/pelvis for new GI complaints. Abdomen/pelvis negative, however, discovery of ill-defined new pulmonary mass in left upper lobe. Note: patient had recent hospitalization for suspected pneumonia      8/16/2016 Biopsy    Left upper lobe transbronchial biopsy. Pathology consistent with recurrent mucinous adenocarcinoma.       9/26/2016 -  Radiation    Underwent CyberKnife x 1 treatment with Dr. Mariann Lomeli      4/13/2017 Imaging    IMPRESSION CT CHEST:  There has been significant progression of disease when  compared with 08/09/2016 specifically the left upper lobe mass noted on  the previous examination has trebled. There is also an increasing mass  in the right upper lobe, now measuring approximately 1.2 cm as well as a  mass in the right lower lobe which has also significantly increased in  size measuring 2.7 cm in size.  IMPRESSION CT ABDOMEN/PELVIS:  There is a 1.4 cm cystic mass adherent to the surface of the  inferomedial aspect of the right lobe of the liver. In retrospect this  was present on the previous examination of 08/01/2016, but not on  earlier examinations in 2015. Given the cystic nature of the original  neoplasm, this likely represents a small solitary metastasis.           Past Medical History:   Diagnosis Date   • Anxiety    • Arthritis    • CHF (congestive heart failure)    • Colitis    • COPD (chronic obstructive pulmonary disease)    • Depression    • Diverticulitis    • Diverticulosis    • Emphysema lung    • Fractures    • H/O bladder problems    • H/O CT scan of chest 06/09/2015    CTA of the chest 6/09/2015, large acute pulmonary emboli, bilateral. RV dilatation, but hemodynamically stable.   • H/O echocardiogram 06/09/2015    Est EF 55-60%. RV moderately dilated. All valves are structurally and functionally normal with no hemodynamically significant valve disease   • H/O: CVA (cerebrovascular accident)    • Heart murmur    • History of blood clots 06/2015    IN LEFT LEG AND  BILATERAL LUNGS, TREATED WITH XARELTO    • History of transfusion    • Hypercholesteremia    • Osteopenia    • Ovarian cancer 01/2015    15 CHEMO TREATMENTS. 6/9/2015: Patient due for cycle 6 of chemo today however will be held secondary to patient's current pulmonary status.   • Pneumonia    • PONV (postoperative nausea and vomiting)    • Pulmonary emboli     Bliateral. Patient is on heparin drip and is on oxygen to keep oxygen saturations greater than 92%. Causing acute shortness of air.   • Staph infection     IN BLOOD STREAM HOSPITALIZED FROM MAY 19-JUNE2,2016, TREATED WITH ABX-VANCOMYCIN   • Stomach problems    • Stroke 1973    MILD WEAKNESS OF LEFT SIDE. History of CVA in 1970s with residual left hand numbness.   • Trigeminal neuralgia    • Urinary incontinence    • Wears dentures    • Wears glasses        Past Surgical History:   Procedure Laterality Date   • APPENDECTOMY     • BLADDER SLING MODIFIED, ANTERIOR AND POSTERIOR VAGINAL REPAIR      PLACED IN 2-2013, REMOVED IN 7/2013 D/T DEFECTIVE MESH AND INFECTION. Suburethral mesh sling placement and subsequent excision due to infection.   • BREAST LUMPECTOMY Right    • BREAST SURGERY Right 06/2005    LUMPECTOMY    • BRONCHOSCOPY N/A 8/16/2016    Procedure: MAURICE BRONCHOSCOPY WITH FLUORO;  Surgeon: Henrry Leo MD;  Location: [facility] ENDOSCOPY;  Service:    • CERVICAL CONIZATION     • COLONOSCOPY     • CYSTOCELE REPAIR  1976, 8-2008,     rectocele repair   • HYSTERECTOMY     • LAPAROTOMY OOPHERECTOMY     • OMENTECTOMY      Infracolic   • OOPHORECTOMY Right    • PORTACATH PLACEMENT     • RECTAL MASS EXCISION      Mesh   • TEETH EXTRACTION     • TOTAL ABDOMINAL HYSTERECTOMY WITH SALPINGO OOPHORECTOMY     • TUBAL ABDOMINAL LIGATION  1976       MEDICATIONS: The current medication list was reviewed with the patient and updated in the EMR this date per the Medical Assistant. Medication dosages and frequencies were confirmed to be accurate.      Allergies:   is allergic to bactrim [sulfamethoxazole-trimethoprim]; penicillins; prednisone; and sulfa antibiotics.    Social History:   Social History     Social History   • Marital status:      Spouse name: N/A   • Number of children: N/A   • Years of education: N/A     Occupational History   • Retired      Social History Main Topics   • Smoking status: Former Smoker     Types: Cigarettes     Quit date: 1997   • Smokeless tobacco: Never Used      Comment: Smoked for 20 years and smoked more than 1 PPD.   • Alcohol use No   • Drug use: No   • Sexual activity: No     Other Topics Concern   • Not on file     Social History Narrative       Family History:    Family History   Problem Relation Age of Onset   • Kidney disease Mother    • Other Mother      heart attack   • Colon cancer Father        Health Maintenance:    Health Maintenance   Topic Date Due   • TDAP/TD VACCINES (1 - Tdap) 01/28/1962   • PNEUMOCOCCAL VACCINES (65+ LOW/MEDIUM RISK) (1 of 2 - PCV13) 01/28/2008   • LIPID PANEL  12/08/2016   • DXA SCAN  12/08/2016   • COLONOSCOPY  12/08/2016   • ZOSTER VACCINE  12/08/2016   • MEDICARE ANNUAL WELLNESS  12/08/2016   • MAMMOGRAM  01/25/2019   • INFLUENZA VACCINE  Completed       Review of Systems   Constitutional: Positive for fatigue. Negative for appetite change, chills, fever and unexpected weight change.   HENT: Positive for sinus pressure. Negative for congestion, hearing loss and sore throat.    Eyes: Negative for redness and visual disturbance.   Respiratory: Positive for cough and shortness of breath. Negative for wheezing.    Cardiovascular: Negative for chest pain, palpitations and leg swelling.   Gastrointestinal: Negative for abdominal distention, abdominal pain, blood in stool, constipation, diarrhea, nausea and vomiting.   Endocrine: Negative.  Negative for cold intolerance and heat intolerance.   Genitourinary: Negative for difficulty urinating, dyspareunia, dysuria, frequency, genital sores,  hematuria, pelvic pain, urgency, vaginal bleeding, vaginal discharge and vaginal pain.   Musculoskeletal: Negative for arthralgias, back pain, gait problem and joint swelling.   Skin: Negative for rash and wound.   Allergic/Immunologic: Negative for food allergies and immunocompromised state.   Neurological: Negative for dizziness, seizures, syncope, weakness, light-headedness, numbness and headaches.   Hematological: Negative for adenopathy. Does not bruise/bleed easily.   Psychiatric/Behavioral: Negative.  Negative for confusion, dysphoric mood and sleep disturbance. The patient is not nervous/anxious.        Physical Exam     /73  Pulse 95  Temp 96.8 °F (36 °C) (Temporal Artery )  Resp 26  Wt 220 lb (99.8 kg)  SpO2 86%  BMI 36.61 kg/m2     SaO2 88% on room air 3 minutes later, up to only 90% after 5 minutes at rest.    Vitals:    04/20/17 1536   BP: 131/62   Pulse: 82   Resp: 20   Temp: 97.2 °F (36.2 °C)   SpO2: 90%     Body mass index is 35.45 kg/(m^2).      GENERAL: Alert, well-appearing female appearing her stated age who is in no apparent distress.  She is not significantly short of breath at rest today.  HEENT: Sclera anicteric. Head normocephalic, atraumatic. Mucus membranes moist.   NECK: deferred  BREASTS: Deferred  CARDIOVASCULAR:  no peripheral edema noted.  RESPIRATORY: deferred  GASTROINTESTINAL:  deferred  SKIN:  Warm, dry, well-perfused.  All visible areas intact.  No rashes, lesions, ulcers.  PSYCHIATRIC: AO x3, with appropriate somewhat sad affect, normal thought processes.  NEUROLOGIC: No focal deficits.  Moves extremities well.  MUSCULOSKELETAL: Normal gait and station.   EXTREMITIES:   No cyanosis, +clubbing, symmetric.  LYMPHATICS:  deferred     PELVIC exam:    Deferred      ECOG PS 2    PROCEDURES:  none    Diagnostic Data:    Lab Results   Component Value Date     10.6 01/25/2017     7.3 10/18/2016     9.0 07/19/2016     6.0 04/19/2016     5.4 01/19/2016      4.0 10/13/2015     9.8 05/20/2015     7.0 04/21/2015     7.5 03/31/2015     5.0 03/10/2015   ]      Assessment/Plan   This is a 72 y.o. woman with a history of stage IC mucinous ovarian cancer now with multiple lung metastasis in interval progression on CT confirmed by PET.  No liver mets noted on PET.    We again reviewed options of observation, radiation, and chemotherapy.  Given that there is multifocal disease, chemotherapy may be the best option.  However, patient is largely asymptomatic except for her respiratory status.    I called Dr. Lomeli to discuss the patient and she is going to have the patient presented at this next week's lung tumor board for discussion of options.  She is going to review these options with the patient in about a week's time.    She does have a history of COPD and history of tobacco abuse, current nonsmoker.  She is not on any inhaled agents for this.  She was prescribed Advair and albuterol.  I explained how to use these medications.  She is willing to get oxygen after she has a place in her home to put this.  This should be in about a month's time.  She is undergoing extensive home renovations at this point.  Her daughter accompanied her today and her other daughter was on a conference phone call with us during the visit.[id number]    FOLLOW UP: Patient is to call for follow-up with me after she sees Dr. Lomeli    This was a 15 minute long visit with 15 minutes spent in face-to-face consultation regarding treatment options for recurrent ovarian cancer, coordination of care with radiation oncology, treatment of hypoxia and shortness of breath.    Note: Speech recognition transcription software was used to dictate portions of this document.  An attempt at proofreading has been made though minor errors in transcription may still be present.  Please do not hesitate to call our office with any questions.      Electronically Signed by: Nitza Saavedra MD  Date:  4/20/2017

## 2017-04-20 NOTE — TELEPHONE ENCOUNTER
Dr. Saavedra called regarding recent PET scan on Mrs. Eduardo tat show increasingly hypermetabolic activity in the left upper lobe and slightly increase activity in the right lower lobe nodule.  The patient does not want further chemotherapy.  I'll present her case at our lung conference on Tuesday morning.

## 2017-04-26 NOTE — PROGRESS NOTES
RE-EVALUATION NOTE    NAME:      Karine Eduardo  :                                                          1945  DATE OF RE-EVALUATION:                       2017   REQUESTING PHYSICIAN:                   Nitza Saavedra MD  REASON FOR CONSULTATION:           Malignant neoplasm of both ovaries          Staging form: Ovary, AJCC V7            Pathologic stage from 2015: Stage IC (T1c, N0, cM0)                BRIEF HISTORY:  Karine Eduardo  is a very pleasant 72 y.o. female    Allergies   Allergen Reactions   • Bactrim [Sulfamethoxazole-Trimethoprim] Hives   • Penicillins Hives   • Prednisone Nausea And Vomiting     SWEATS    • Sulfa Antibiotics Hives       Social History   Substance Use Topics   • Smoking status: Former Smoker     Types: Cigarettes     Quit date:    • Smokeless tobacco: Never Used      Comment: Smoked for 20 years and smoked more than 1 PPD.   • Alcohol use No       Past Medical History:   Diagnosis Date   • Anxiety    • Arthritis    • CHF (congestive heart failure)    • Colitis    • COPD (chronic obstructive pulmonary disease)    • Depression    • Diverticulitis    • Diverticulosis    • Emphysema lung    • Fractures    • H/O bladder problems    • H/O CT scan of chest 2015    CTA of the chest 2015, large acute pulmonary emboli, bilateral. RV dilatation, but hemodynamically stable.   • H/O echocardiogram 2015    Est EF 55-60%. RV moderately dilated. All valves are structurally and functionally normal with no hemodynamically significant valve disease   • H/O: CVA (cerebrovascular accident)    • Heart murmur    • History of blood clots 2015    IN LEFT LEG AND BILATERAL LUNGS, TREATED WITH XARELTO    • History of transfusion    • Hypercholesteremia    • Osteopenia    • Ovarian cancer 2015    15 CHEMO TREATMENTS. 2015: Patient due for cycle 6 of chemo today however will be held secondary to patient's current pulmonary status.   • Pneumonia   "  • PONV (postoperative nausea and vomiting)    • Pulmonary emboli     Bliateral. Patient is on heparin drip and is on oxygen to keep oxygen saturations greater than 92%. Causing acute shortness of air.   • Staph infection     IN BLOOD STREAM HOSPITALIZED FROM MAY 19-JUNE2,2016, TREATED WITH ABX-VANCOMYCIN   • Stomach problems    • Stroke 1973    MILD WEAKNESS OF LEFT SIDE. History of CVA in 1970s with residual left hand numbness.   • Trigeminal neuralgia    • Urinary incontinence    • Wears dentures    • Wears glasses        family history includes Colon cancer in her father; Kidney disease in her mother; Other in her mother.     Past Surgical History:   Procedure Laterality Date   • APPENDECTOMY     • BLADDER SLING MODIFIED, ANTERIOR AND POSTERIOR VAGINAL REPAIR      PLACED IN 2-2013, REMOVED IN 7/2013 D/T DEFECTIVE MESH AND INFECTION. Suburethral mesh sling placement and subsequent excision due to infection.   • BREAST LUMPECTOMY Right    • BREAST SURGERY Right 06/2005    LUMPECTOMY    • BRONCHOSCOPY N/A 8/16/2016    Procedure: MAURICE BRONCHOSCOPY WITH FLUORO;  Surgeon: Henrry Leo MD;  Location: [facility] ENDOSCOPY;  Service:    • CERVICAL CONIZATION     • COLONOSCOPY     • CYSTOCELE REPAIR  1976, 8-2008,     rectocele repair   • HYSTERECTOMY     • LAPAROTOMY OOPHERECTOMY     • OMENTECTOMY      Infracolic   • OOPHORECTOMY Right    • PORTACATH PLACEMENT     • RECTAL MASS EXCISION      Mesh   • TEETH EXTRACTION     • TOTAL ABDOMINAL HYSTERECTOMY WITH SALPINGO OOPHORECTOMY     • TUBAL ABDOMINAL LIGATION  1976        Review of Systems   Respiratory: Positive for chest tightness, cough, shortness of breath and wheezing.         Pt needs oxygen, but refuses.   All other systems reviewed and are negative.          Objective   VITAL SIGNS:   Vitals:    04/26/17 0933   BP: 145/64   Pulse: 77   Resp: 16   Temp: 97.6 °F (36.4 °C)   SpO2: (!) 89%   Weight: 217 lb (98.4 kg)   Height: 65\" (165.1 cm)   PainSc: 0-No pain "        KPS       80%    Physical Exam   Constitutional: She appears well-developed and well-nourished.   HENT:   Head: Atraumatic.   Eyes: EOM are normal.   Neck: Neck supple.   Cardiovascular: Normal rate, regular rhythm and normal heart sounds.    Pulmonary/Chest: Effort normal and breath sounds normal.   Nursing note and vitals reviewed.           The following portions of the patient's history were reviewed and updated as appropriate: allergies, current medications, past family history, past medical history, past social history, past surgical history and problem list.    Assessment      IMPRESSION:  Progression of disease    RECOMMENDATIONS:  We discussed Mrs. Eduardo's case at tumor board yesterday.  The recommendation was no further radiotherapy but encourage patient to undergo chemotherapy.  A bronchoscopy was recommended but on further review Dr. De Jesus decided against it.  I discussed this today with Mrs. Eduardo and her family.  I encouraged her to use home O2, she has a saturation of 89%.  She is going to discuss chemo and O2 with Dr. Saavedra.  She and her family understood why we did not recommend further radiation.         Mariann Lomeli MD      Errors in dictation may reflect use of voice recognition software and not all errors in transcription may have been detected prior to signing.

## 2017-04-27 NOTE — PROGRESS NOTES
Given patient's recurrent biopsy proven ovarian cancer, I would recommend carboplatin and at AUC of 6+ docetaxel at 75 mg/m² plus Avastin.  Typically, I would recommend paclitaxel, but patient has a history of trigeminal neuralgia on chemotherapy and would like to pursue with a less neurologically toxic regimen if possible.  Previously, I could not be excluded that the paclitaxel was related to the trigeminal neuralgia.

## 2017-04-27 NOTE — TELEPHONE ENCOUNTER
----- Message from Liliana Alcantar sent at 4/26/2017 11:26 AM EDT -----  Regarding: FW: Patient drive by      ----- Message -----     From: Liliana Alcantar     Sent: 4/26/2017  10:20 AM       To: BRIAN Morales  Subject: Patient drive by                                 Mrs. Eduardo dropped by the office today to ask questions about possible chemo drugs. She also said that she is now needing home oxygen. Please give her a call back on her daughters cell number at [phone]    Thank You.       04/27/17 9:51:  After discussing with Dr. Saavedra I called pt's daughter, Irma.  Informed her that chemotherapy drugs would most likely be Carboplatin, Docetaxel and Bevacizumab.  She said pt still declines home 02 at this time and is uncertain if she wants to attempt chemotherapy.  She is going to talk with her mother and call our office back today or tomorrow.

## 2017-04-28 NOTE — TELEPHONE ENCOUNTER
Pt's daughter, Karissa, called and said pt does want to proceed with chemo but not until 05/25/17.  Message sent to LUCIANO Abdi to schedule this.  Chemo ed scheduled for 05/05/17.  Informed that pre and post chemo meds will be sent to pharmacy today and they may bring them to chemo ed to review in detail.  She v/u.

## 2017-04-28 NOTE — TELEPHONE ENCOUNTER
----- Message from Adilia Abdi sent at 4/28/2017 10:11 AM EDT -----  Regarding: chemo start  Received message from Raven that she had spoke with patients daughter and patient was to start new chemo on 5/25/17.  I got scheduled and called patient. She was not happy with these dates. She stated she preferred to start on 5/4/17.  I told her the reason we had chose that date was because her daughter had requested that date.  She told me she didn't care what Karissa had said she wanted to start sooner.  If she started on 5/4/17 she would be due for another treatment on 5/25/17 when additional family was present. All appointments changed and Raven to call patient to get chemo education scheduled.       04/28/17  10:29am: I contacted pt and rescheduled chemo ed for 05/01/17 at 1:30 pm.

## 2017-05-01 PROBLEM — T45.1X5A ALOPECIA DUE TO CYTOTOXIC DRUG: Status: ACTIVE | Noted: 2017-01-01

## 2017-05-01 PROBLEM — L65.8 ALOPECIA DUE TO CYTOTOXIC DRUG: Status: ACTIVE | Noted: 2017-01-01

## 2017-05-03 PROBLEM — R82.90 ABNORMAL URINALYSIS: Status: ACTIVE | Noted: 2017-01-01

## 2017-05-04 PROBLEM — Z79.899 ON ANTINEOPLASTIC CHEMOTHERAPY: Status: ACTIVE | Noted: 2017-01-01

## 2017-05-08 PROBLEM — B00.2 ORAL HERPES: Status: ACTIVE | Noted: 2017-01-01

## 2017-05-26 PROBLEM — K59.03 DRUG-INDUCED CONSTIPATION: Status: ACTIVE | Noted: 2017-01-01

## 2017-06-08 NOTE — TELEPHONE ENCOUNTER
I called pt and reviewed labs.  Reviewed neutropenic precautions.  Pt constipated.  She is having bowel movements daily but descibed them as small and pebble like.  I advised she take stool softener bid, as prescribed, and to add 3 doses of miralax and increase po intake of clear, decaffeinated, non-alcoholic fluids.  We discussed that eating cooled foods may help decrease nausea related to food smells.  She v/u and will call me tomorrow if no bowel movement or if stool still hard and pebble like.

## 2017-06-15 PROBLEM — E86.0 DEHYDRATION: Status: ACTIVE | Noted: 2017-01-01

## 2017-06-15 PROBLEM — R53.0 NEOPLASTIC MALIGNANT RELATED FATIGUE: Status: ACTIVE | Noted: 2017-01-01

## 2017-06-15 PROBLEM — K62.5 RECTAL BLEEDING: Status: ACTIVE | Noted: 2017-01-01

## 2017-06-18 NOTE — PROGRESS NOTES
Karine VERDUZCO [facility]  [id number]  1945      Reason for visit:   Recurrent ovarian cancer, history of COPD, shortness of breath with, chemotherapy, lung metastasis    History of present illness:  The patient is a 72 y.o. year old female who presents today prior to chemotherapy.  She has biopsy-proven recurrent ovarian cancer.  Current plan is to proceed with carboplatin at AUC of 6, docetaxel 75 mg/m² and Avastin (patient has a history of trigeminal neuralgia that was debilitating with paclitaxel).  Goal was to complete 3 cycles and evaluate efficacy.  Avastin was held last cycle due to BRBPR.    Today, patient notes that her shortness of breath is stable.   She notes decreased energy, but is OOB and doing housework and laundry. She continues to have rectal bleeding and notes that she is taking medication so that her stool has been loose the last few days.  She notes that her appetite is getting better.  She notes dysgeusia and altered sense of scent.    Oncologic History:     Malignant neoplasm of both ovaries    12/16/2014 Imaging    TVUS for PMB showed mildly enlarged uterus, 18 mm endometrial stripe, and 14 cm right adnexal mass      1/19/2015 Surgery    RTLH/BSO with infracolic omentectomy, peritoneal biopsies, and appendectomy. 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 Stage IC grade 1 by final path.       - 5/20/2015 Chemotherapy    Cycle #5 of Carboplatin and dose dense paclitaxel. Chemo course significant for bone marrow suppression requiring transfusion and GC SF, facial trigeminal neuropathy exacerbation, and pulmonary emboli.       6/22/2015 Genetic Testing    All genes on GYN[facility] Panel through [facility] negative      7/9/2015 Imaging    CT scan negative for disease      7/19/2016 Imaging    CT chest/abdomen/pelvis for new GI complaints. Abdomen/pelvis negative, however, discovery of ill-defined new  pulmonary mass in left upper lobe. Note: patient had recent hospitalization for suspected pneumonia      8/16/2016 Biopsy    Left upper lobe transbronchial biopsy. Pathology consistent with recurrent mucinous adenocarcinoma.       9/26/2016 -  Radiation    Underwent CyberKnife x 1 treatment with Dr. Mariann Lomeli      4/13/2017 Imaging    IMPRESSION CT CHEST:  There has been significant progression of disease when  compared with 08/09/2016 specifically the left upper lobe mass noted on  the previous examination has trebled. There is also an increasing mass  in the right upper lobe, now measuring approximately 1.2 cm as well as a  mass in the right lower lobe which has also significantly increased in  size measuring 2.7 cm in size.  IMPRESSION CT ABDOMEN/PELVIS:  There is a 1.4 cm cystic mass adherent to the surface of the  inferomedial aspect of the right lobe of the liver. In retrospect this  was present on the previous examination of 08/01/2016, but not on  earlier examinations in 2015. Given the cystic nature of the original  neoplasm, this likely represents a small solitary metastasis.           Past Medical History:   Diagnosis Date   • Anxiety    • Arthritis    • CHF (congestive heart failure)    • Colitis    • COPD (chronic obstructive pulmonary disease)    • Depression    • Diverticulitis    • Diverticulosis    • Emphysema lung    • Fractures    • H/O bladder problems    • H/O CT scan of chest 06/09/2015    CTA of the chest 6/09/2015, large acute pulmonary emboli, bilateral. RV dilatation, but hemodynamically stable.   • H/O echocardiogram 06/09/2015    Est EF 55-60%. RV moderately dilated. All valves are structurally and functionally normal with no hemodynamically significant valve disease   • H/O: CVA (cerebrovascular accident)    • Heart murmur    • History of blood clots 06/2015    IN LEFT LEG AND BILATERAL LUNGS, TREATED WITH XARELTO    • History of transfusion    • Hypercholesteremia    • Osteopenia    •  Ovarian cancer 01/2015    15 CHEMO TREATMENTS. 6/9/2015: Patient due for cycle 6 of chemo today however will be held secondary to patient's current pulmonary status.   • Pneumonia    • PONV (postoperative nausea and vomiting)    • Pulmonary emboli     Bliateral. Patient is on heparin drip and is on oxygen to keep oxygen saturations greater than 92%. Causing acute shortness of air.   • Staph infection     IN BLOOD STREAM HOSPITALIZED FROM MAY 19-JUNE2,2016, TREATED WITH ABX-VANCOMYCIN   • Stomach problems    • Stroke 1973    MILD WEAKNESS OF LEFT SIDE. History of CVA in 1970s with residual left hand numbness.   • Trigeminal neuralgia    • Urinary incontinence    • Wears dentures    • Wears glasses        Past Surgical History:   Procedure Laterality Date   • APPENDECTOMY     • BLADDER SLING MODIFIED, ANTERIOR AND POSTERIOR VAGINAL REPAIR      PLACED IN 2-2013, REMOVED IN 7/2013 D/T DEFECTIVE MESH AND INFECTION. Suburethral mesh sling placement and subsequent excision due to infection.   • BREAST LUMPECTOMY Right    • BREAST SURGERY Right 06/2005    LUMPECTOMY    • BRONCHOSCOPY N/A 8/16/2016    Procedure: MAURICE BRONCHOSCOPY WITH FLUORO;  Surgeon: Henrry Leo MD;  Location: [facility] ENDOSCOPY;  Service:    • CERVICAL CONIZATION     • COLONOSCOPY     • CYSTOCELE REPAIR  1976, 8-2008,     rectocele repair   • HYSTERECTOMY     • LAPAROTOMY OOPHERECTOMY     • OMENTECTOMY      Infracolic   • OOPHORECTOMY Right    • PORTACATH PLACEMENT     • RECTAL MASS EXCISION      Mesh   • TEETH EXTRACTION     • TOTAL ABDOMINAL HYSTERECTOMY WITH SALPINGO OOPHORECTOMY     • TUBAL ABDOMINAL LIGATION  1976       MEDICATIONS: The current medication list was reviewed with the patient and updated in the EMR this date per the Medical Assistant. Medication dosages and frequencies were confirmed to be accurate.      Allergies:  is allergic to percocet [oxycodone-acetaminophen]; bactrim [sulfamethoxazole-trimethoprim]; penicillins;  prednisone; and sulfa antibiotics.    Social History:   Social History     Social History   • Marital status:      Spouse name: N/A   • Number of children: N/A   • Years of education: N/A     Occupational History   • Retired      Social History Main Topics   • Smoking status: Former Smoker     Types: Cigarettes     Quit date: 1997   • Smokeless tobacco: Never Used      Comment: Smoked for 20 years and smoked more than 1 PPD.   • Alcohol use No   • Drug use: No   • Sexual activity: No     Other Topics Concern   • Not on file     Social History Narrative       Family History:    Family History   Problem Relation Age of Onset   • Kidney disease Mother    • Other Mother      heart attack   • Colon cancer Father        Health Maintenance:    Health Maintenance   Topic Date Due   • TDAP/TD VACCINES (1 - Tdap) 01/28/1962   • PNEUMOCOCCAL VACCINES (65+ LOW/MEDIUM RISK) (1 of 2 - PCV13) 01/28/2008   • MEDICARE ANNUAL WELLNESS  12/08/2016   • LIPID PANEL  12/08/2016   • DXA SCAN  12/08/2016   • COLONOSCOPY  12/08/2016   • ZOSTER VACCINE  12/08/2016   • INFLUENZA VACCINE  08/01/2017   • MAMMOGRAM  01/25/2019       Review of Systems   Constitutional: Positive for fatigue. Negative for appetite change, chills, fever and unexpected weight change.   HENT: Negative for congestion, hearing loss and sore throat.    Eyes: Negative for redness and visual disturbance.   Respiratory: Positive for cough and shortness of breath. Negative for chest tightness and wheezing.    Cardiovascular: Negative for chest pain, palpitations and leg swelling.   Gastrointestinal: Positive for anal bleeding (with wiping. ) and constipation. Negative for abdominal distention, abdominal pain, blood in stool, diarrhea, nausea and vomiting.   Endocrine: Negative.  Negative for cold intolerance and heat intolerance.   Genitourinary: Negative for difficulty urinating, dyspareunia, dysuria, frequency, genital sores, hematuria, pelvic pain, urgency,  vaginal bleeding, vaginal discharge and vaginal pain.   Musculoskeletal: Negative for arthralgias, back pain, gait problem and joint swelling.   Skin: Negative for rash and wound.   Allergic/Immunologic: Negative for food allergies and immunocompromised state.   Neurological: Negative for dizziness, seizures, syncope, weakness, light-headedness, numbness and headaches.   Hematological: Negative for adenopathy. Does not bruise/bleed easily.   Psychiatric/Behavioral: Negative for confusion, dysphoric mood and sleep disturbance. The patient is nervous/anxious.        Physical Exam     /73  Pulse 95  Temp 96.8 °F (36 °C) (Temporal Artery )  Resp 26  Wt 220 lb (99.8 kg)  SpO2 86%  BMI 36.61 kg/m2     SaO2 88% on room air 3 minutes later, up to only 90% after 5 minutes at rest.  See below    Vitals:    06/15/17 1014   BP: 129/66   Pulse: 94   Resp: 20   Temp: 96.6 °F (35.9 °C)   SpO2: (!) 82%     Body mass index is 35.78 kg/(m^2).      GENERAL: Alert, well-appearing female appearing her stated age who is in no apparent distress.  She is not significantly short of breath at rest today; Nasal cannula in place at 2 L.  HEENT: Sclera anicteric. Head normocephalic, atraumatic. Mucus membranes moist.   NECK: deferred  BREASTS: Deferred  CARDIOVASCULAR:  Regular rate and rhythm, no murmurs, rubs, gallops.  RESPIRATORY: Normal effort with nasal cannula in place, lungs were clear to auscultation bilaterally.  GASTROINTESTINAL:  Abdomen soft, obese, no masses.  SKIN:  Warm, dry, well-perfused.  All visible areas intact.  No rashes, lesions, ulcers.  PSYCHIATRIC: AO x3, with appropriate somewhat sad affect, normal thought processes.  NEUROLOGIC: No focal deficits.  Moves extremities well.  MUSCULOSKELETAL: Normal gait and station.  Mildly point tender over rib at left mid axillary line just lateral to left breast.  EXTREMITIES:   No cyanosis, +clubbing, symmetric.  LYMPHATICS:  No cervical adenopathy     PELVIC exam:     Normal-appearing external genitalia, atrophic pale vagina.  Vaginal cuff appears intact without lesion.  No evidence of external hemorrhoids.  On bimanual examination, uterus cervix and adnexa were surgically absent.  No masses appreciated.  Parametria were smooth.  There is no significant tenderness.  Rectovaginal exam was deferred.    ECOG PS 2    PROCEDURES:  none    Diagnostic Data:      Lab Results   Component Value Date    GLUCOSE 125 (H) 06/15/2017    BUN 27 (H) 06/15/2017    CREATININE 0.80 06/15/2017    EGFRIFNONA 61 06/15/2017    BCR 30.0 (H) 06/15/2017    K 5.3 06/15/2017    CO2 30.0 06/15/2017    CALCIUM 9.8 06/15/2017    PROTENTOTREF 6.0 06/09/2015    ALBUMIN 4.20 06/15/2017    LABIL2 1.4 (L) 06/15/2017    AST 13 06/15/2017    ALT 12 06/15/2017         WBC   Date Value Ref Range Status   06/15/2017 5.50 3.50 - 10.80 10*3/mm3 Final     RBC   Date Value Ref Range Status   06/15/2017 2.85 (L) 3.89 - 5.14 10*6/mm3 Final     Hemoglobin   Date Value Ref Range Status   06/15/2017 9.2 (L) 11.5 - 15.5 g/dL Final     Hematocrit   Date Value Ref Range Status   06/15/2017 28.4 (L) 34.5 - 44.0 % Final     MCV   Date Value Ref Range Status   06/15/2017 99.7 (H) 80.0 - 99.0 fL Final     MCH   Date Value Ref Range Status   06/15/2017 32.2 (H) 27.0 - 31.0 pg Final     MCHC   Date Value Ref Range Status   06/15/2017 32.2 32.0 - 36.0 g/dL Final     RDW   Date Value Ref Range Status   06/15/2017 21.5 (H) 11.3 - 14.5 % Final     RDW-SD   Date Value Ref Range Status   08/10/2016 51.3 37.0 - 54.0 fl Final     MPV   Date Value Ref Range Status   06/15/2017 7.8 6.0 - 12.0 fL Final     Platelets   Date Value Ref Range Status   06/15/2017 154 150 - 450 10*3/mm3 Final     Comment:     Verified by repeat analysis.      Neutrophil %   Date Value Ref Range Status   06/15/2017 82.1 (H) 41.0 - 71.0 % Final     Lymphocyte %   Date Value Ref Range Status   06/15/2017 14.7 (L) 24.0 - 44.0 % Final     Monocyte %   Date Value Ref Range  Status   06/15/2017 3.2 0.0 - 12.0 % Final     Eosinophil Rel %   Date Value Ref Range Status   07/09/2015 3.4 (H) 0.0 - 3.0 % Final     Basophil Rel %   Date Value Ref Range Status   07/09/2015 0.4 0.0 - 1.0 % Final     Neutrophils, Absolute   Date Value Ref Range Status   06/15/2017 4.50 1.50 - 8.30 10*3/mm3 Final     Lymphocytes, Absolute   Date Value Ref Range Status   06/15/2017 0.80 0.60 - 4.80 10*3/mm3 Final     Monocytes, Absolute   Date Value Ref Range Status   06/15/2017 0.20 0.00 - 1.00 10*3/mm3 Final     Eosinophils Absolute   Date Value Ref Range Status   08/10/2016 0.00 (L) 0.10 - 0.30 10*3/mm3 Final     Basophils Absolute   Date Value Ref Range Status   08/10/2016 0.00 0.00 - 0.20 10*3/mm3 Final     nRBC   Date Value Ref Range Status   06/15/2015 0.0  Final       Lab Results   Component Value Date     28.6 06/15/2017     21.8 05/25/2017     24.9 05/01/2017     10.6 01/25/2017     7.3 10/18/2016     9.0 07/19/2016     6.0 04/19/2016     5.4 01/19/2016     4.0 10/13/2015     9.8 05/20/2015   ]      Assessment/Plan   This is a 72 y.o. woman with a history of stage IC mucinous ovarian cancer now with multiple lung metastasis in interval progression on CT confirmed by PET.  No liver mets noted on PET.    Stage IC mucinous ovarian cancer, recurrent.  Plan is to proceed with chemotherapy of carboplatin, docetaxel at this time.  Hold Avastin since patient still with rectal bleeding.  Repeat CT in ~3 weeks to evaluate chemo for efficacy.    Rectal bleeding:  Suspect that this is most likely due to internal hemorrhoids with patient's constipation and hard stool.  Encouraged the use of stool softener twice a day and increase use of MiraLAX when necessary. Improved with bowel regime, but still mild rectal bleeding.    History of DVT/PE:  On Xarelto    Musculoskeletal pain:  Patient with mild point tenderness over her left rib, denies any tenderness.  Encouraged  ibuprofen/Tylenol when necessary.    Chronic hypoxia/COPD/lung metastasis s/p radiation:  Patient has supplemental oxygen available at home, brought portable O2 to clinic today.    FOLLOW UP: For chemotherapy as scheduled.    Note: Speech recognition transcription software was used to dictate portions of this document.  An attempt at proofreading has been made though minor errors in transcription may still be present.  Please do not hesitate to call our office with any questions.      Electronically Signed by: Nitza Saavedra MD  Date: 6/17/2017           Patient was seen and examined with Dr. Marcos,  resident, who performed portions of the examination and documentation for this patient's care under my direct supervision.    Nitza Saavedra MD  06/17/17  2:32 PM

## 2017-06-22 PROBLEM — R11.0 CHEMOTHERAPY-INDUCED NAUSEA: Status: ACTIVE | Noted: 2017-01-01

## 2017-06-22 PROBLEM — T45.1X5A CHEMOTHERAPY-INDUCED NAUSEA: Status: ACTIVE | Noted: 2017-01-01

## 2017-06-23 PROBLEM — T45.1X5A PANCYTOPENIA DUE TO ANTINEOPLASTIC CHEMOTHERAPY (HCC): Status: ACTIVE | Noted: 2017-01-01

## 2017-06-23 PROBLEM — T45.1X5A CHEMOTHERAPY-INDUCED NEUTROPENIA (HCC): Status: ACTIVE | Noted: 2017-01-01

## 2017-06-23 PROBLEM — D70.1 CHEMOTHERAPY-INDUCED NEUTROPENIA (HCC): Status: ACTIVE | Noted: 2017-01-01

## 2017-06-23 PROBLEM — D61.810 PANCYTOPENIA DUE TO ANTINEOPLASTIC CHEMOTHERAPY (HCC): Status: ACTIVE | Noted: 2017-01-01

## 2017-06-23 NOTE — TELEPHONE ENCOUNTER
Received call from Anastasia in Outpt Infusion.  States pt is there, her WBC=1.3 ABS NEUTS=0.2.  Dr. Sevilla informed, she will go and see her in outpt infusion.

## 2017-06-23 NOTE — PROGRESS NOTES
GYN Oncology    Patient followed in care by Dr. Saavedra.  I was asked to evaluate her in infusion secondary to her feeling poorly and having pancytopenia.  Patient is recently status post cycle 3 of carboplatin and Taxotere.  She presented to outpatient infusion today for an interval planned visit for IV fluid hydration.      Her and her daughter report that she has felt progressively poorly over the past 3-4 days.  Her most bothersome symptom has been a sore throat.  No cough.  No sinus issue or rhinorrhea.  Denies sick contacts.  No fevers.  She's had a poor appetite both related to an independent of the sore throat.  No nausea.  Denies pain.  Stable baseline urinary incontinence.  Bowel movements every few days.  Fatigue is poor but stable from baseline.    Lab Results   Component Value Date    WBC 1.30 (C) 06/23/2017    HGB 7.4 (L) 06/23/2017    HCT 22.3 (L) 06/23/2017    MCV 97.8 06/23/2017     (L) 06/23/2017     ECOG PS 2    GENERAL: Alert, pale ill non-toxic appearing female in no apparent distress.  She appears her stated age.  She is here with her daughter.  HEENT: Sclera anicteric, normal eyelids. Head normocephalic, atraumatic. Mucus membranes moist.  Normal dentition.  Oropharynx is without exudate erythema or lesions  NECK: Trachea midline, supple, without masses.  No thyromegaly.   CHEST: PAC c/d/i and accessed     CARDIOVASCULAR: Normal rate, regular rhythm, no murmurs, rubs, or gallops.  Extremities symmetric.  No peripheral edema.  RESPIRATORY: Normal respiratory effort, diffuse inspiratory and expiratory posterior wheezes  MUSCULOSKELETAL:  FROMx4.  No digital cyanosis.    SKIN:  Warm, dry, well-perfused.  All visible areas intact.  No rashes, lesions, ulcers.  PSYCHIATRIC: AO x3, with appropriate affect, normal thought processes    A/P:  Elderly patient with failure to thrive, pancytopenia in setting of recurrent ovarian cancer  -Advise admission  -She may have her planned chest CT  performed now prior to admission as this will be helpful in evaluating a pulmonary source as well as assessing any response to treatment thus far  -Neulasta now  -Empiric antibiotics    Case d/w resident and Dr. Quentin Sevilla MD  06/23/17  3:20 PM

## 2017-06-26 NOTE — TELEPHONE ENCOUNTER
Karissa, pt's daughter, called and wanted to know if her mother had to stop taking her zofran, phenergan and tramadol.  I told her that pt may continue taking these as prescribed, as needed.  She v/u.

## 2017-06-30 NOTE — PROGRESS NOTES
Pt's daughter Michel phoned office.  States her mom is complaining of the throat hurting again but on the other side.  Dr. Saavedra informed,  Magic Mouthwash  Doxycycline 1.5 gm  Hydrocortisone 20 mg tablet x 2  Nystatin 30 ml  Benadryl 4 oz   Distilled h2o to = 8 oz,  Swish and swallow 2-3 times daily prn with 1 refill called to [facility] in [place], [place] per Dr. Saavedra v/o.  Ms. Eduardo informed, told her local pharmacies near her did not make it.  Pt v/u, she will have her daughter pick it up.  Instructed her to call if needed.

## 2017-07-06 PROBLEM — T45.1X5A CHEMOTHERAPY-INDUCED NAUSEA: Status: RESOLVED | Noted: 2017-01-01 | Resolved: 2017-01-01

## 2017-07-06 PROBLEM — R58 ECCHYMOSIS ON EXAMINATION: Status: ACTIVE | Noted: 2017-01-01

## 2017-07-06 PROBLEM — D61.810 PANCYTOPENIA DUE TO ANTINEOPLASTIC CHEMOTHERAPY (HCC): Status: RESOLVED | Noted: 2017-01-01 | Resolved: 2017-01-01

## 2017-07-06 PROBLEM — T45.1X5A CHEMOTHERAPY-INDUCED NEUTROPENIA (HCC): Status: RESOLVED | Noted: 2017-01-01 | Resolved: 2017-01-01

## 2017-07-06 PROBLEM — R11.0 CHEMOTHERAPY-INDUCED NAUSEA: Status: RESOLVED | Noted: 2017-01-01 | Resolved: 2017-01-01
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
## 2017-07-06 PROBLEM — D70.1 CHEMOTHERAPY-INDUCED NEUTROPENIA (HCC): Status: RESOLVED | Noted: 2017-01-01 | Resolved: 2017-01-01

## 2017-07-06 PROBLEM — T45.1X5A PANCYTOPENIA DUE TO ANTINEOPLASTIC CHEMOTHERAPY (HCC): Status: RESOLVED | Noted: 2017-01-01 | Resolved: 2017-01-01

## 2017-07-06 NOTE — PROGRESS NOTES
Karine VERDUZCO [facility]  [id number]  1945      Reason for visit:  Follow up from admission    History of present illness:  The patient is a 72 y.o. year old female with recurrent ovarian cancer, s/p recent admission for chemotherapy induced pancytopenia, neutropenic fever, urinary tract infection, COPD exacerbation.    Patient and two daughters here today for visit. They report that 2 days ago the patient fell next to a table getting out of bed. The family attributes the fall to being drowsy at the time after just waking up. She hit her right side and flank/ back and has large bruise on that side. She reports some pain in that area. Since then the daughters are concerned because the patient is having trouble getting out of bed on her own since the incident due to soreness. The patient says she gets around with a walker decently well otherwise. She reports no bowel movement in 2-3 days with colace BID but has not yet tried miralax which she has and will try. She is voiding in a diaper but has no acute changes with those issues. She still reports sore throat that she presented with on recent admission but the magic mouthwash helps and she is using it. She did have one episode of nausea/emesis yesterday when her daughters did not give her her nausea medications. They said she uses them routinely and they wanted to see if she didn't need them. She otherwise has a great appetite and the meds control her nausea well. She denies fevers at home or feeling ill. She denies dizziness when walking or standing. She denies cough.       Oncologic History:     Malignant neoplasm of both ovaries    12/16/2014 Imaging    TVUS for PMB showed mildly enlarged uterus, 18 mm endometrial stripe, and 14 cm right adnexal mass      1/19/2015 Surgery    RTLH/BSO with infracolic omentectomy, peritoneal biopsies, and appendectomy. 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 Stage IC grade 1 by final path.       - 5/20/2015 Chemotherapy    Cycle #5 of Carboplatin and dose dense paclitaxel. Chemo course significant for bone marrow suppression requiring transfusion and GC SF, facial trigeminal neuropathy exacerbation, and pulmonary emboli.       6/22/2015 Genetic Testing    All genes on GYNplus Panel through [facility] negative      7/9/2015 Imaging    CT scan negative for disease      7/19/2016 Imaging    CT chest/abdomen/pelvis for new GI complaints. Abdomen/pelvis negative, however, discovery of ill-defined new pulmonary mass in left upper lobe. Note: patient had recent hospitalization for suspected pneumonia      8/16/2016 Biopsy    Left upper lobe transbronchial biopsy. Pathology consistent with recurrent mucinous adenocarcinoma.       9/26/2016 -  Radiation    Underwent CyberKnife x 1 treatment with Dr. Mariann Lomeli      4/13/2017 Imaging    IMPRESSION CT CHEST:  There has been significant progression of disease when  compared with 08/09/2016 specifically the left upper lobe mass noted on  the previous examination has trebled. There is also an increasing mass  in the right upper lobe, now measuring approximately 1.2 cm as well as a  mass in the right lower lobe which has also significantly increased in  size measuring 2.7 cm in size.  IMPRESSION CT ABDOMEN/PELVIS:  There is a 1.4 cm cystic mass adherent to the surface of the  inferomedial aspect of the right lobe of the liver. In retrospect this  was present on the previous examination of 08/01/2016, but not on  earlier examinations in 2015. Given the cystic nature of the original  neoplasm, this likely represents a small solitary metastasis.      5/4/2017 - 6/15/2017 Chemotherapy    Began Carbo/Taxotere every three weeks.  Underwent a Avastin with for cycle.  This is been held due to rectal bleeding.  Total of 3 cycles.  Interval imaging showed no change in disease and chemotherapy was discontinued.         Ovarian cancer    8/9/2016 Initial Diagnosis    Ovarian cancer         Past Medical History:   Diagnosis Date   • Anxiety    • Arthritis    • CHF (congestive heart failure)    • Colitis    • COPD (chronic obstructive pulmonary disease)    • Depression    • Diverticulitis    • Diverticulosis    • Emphysema lung    • Fractures    • H/O bladder problems    • H/O CT scan of chest 06/09/2015    CTA of the chest 6/09/2015, large acute pulmonary emboli, bilateral. RV dilatation, but hemodynamically stable.   • H/O echocardiogram 06/09/2015    Est EF 55-60%. RV moderately dilated. All valves are structurally and functionally normal with no hemodynamically significant valve disease   • H/O: CVA (cerebrovascular accident)    • Heart murmur    • History of blood clots 06/2015    IN LEFT LEG AND BILATERAL LUNGS, TREATED WITH XARELTO    • History of transfusion    • Hypercholesteremia    • Osteopenia    • Ovarian cancer 01/2015    15 CHEMO TREATMENTS. 6/9/2015: Patient due for cycle 6 of chemo today however will be held secondary to patient's current pulmonary status.   • Pneumonia    • PONV (postoperative nausea and vomiting)    • Pulmonary emboli     Bliateral. Patient is on heparin drip and is on oxygen to keep oxygen saturations greater than 92%. Causing acute shortness of air.   • Staph infection     IN BLOOD STREAM HOSPITALIZED FROM MAY 19-JUNE2,2016, TREATED WITH ABX-VANCOMYCIN   • Stroke 1973    MILD WEAKNESS OF LEFT SIDE. History of CVA in 1970s with residual left hand numbness.   • Trigeminal neuralgia    • Urinary incontinence    • Wears dentures    • Wears glasses        Past Surgical History:   Procedure Laterality Date   • APPENDECTOMY     • BLADDER SLING MODIFIED, ANTERIOR AND POSTERIOR VAGINAL REPAIR      PLACED IN 2-2013, REMOVED IN 7/2013 D/T DEFECTIVE MESH AND INFECTION. Suburethral mesh sling placement and subsequent excision due to infection.   • BREAST LUMPECTOMY Right    • BREAST SURGERY Right 06/2005     LUMPECTOMY    • BRONCHOSCOPY N/A 8/16/2016    Procedure: MAURICE BRONCHOSCOPY WITH FLUORO;  Surgeon: Henrry Leo MD;  Location: [facility] ENDOSCOPY;  Service:    • CERVICAL CONIZATION     • COLONOSCOPY     • CYSTOCELE REPAIR  1976, 8-2008,     rectocele repair   • HYSTERECTOMY     • LAPAROTOMY OOPHERECTOMY     • OMENTECTOMY      Infracolic   • OOPHORECTOMY Right    • PORTACATH PLACEMENT     • RECTAL MASS EXCISION      Mesh   • TEETH EXTRACTION     • TOTAL ABDOMINAL HYSTERECTOMY WITH SALPINGO OOPHORECTOMY     • TUBAL ABDOMINAL LIGATION  1976       MEDICATIONS: The current medication list was reviewed with the patient and updated in the EMR this date per the Medical Assistant. Medication dosages and frequencies were confirmed to be accurate.      Allergies:  is allergic to percocet [oxycodone-acetaminophen]; bactrim [sulfamethoxazole-trimethoprim]; penicillins; prednisone; and sulfa antibiotics.    Social History:   Social History     Social History   • Marital status:      Spouse name: N/A   • Number of children: N/A   • Years of education: N/A     Occupational History   • Retired      Social History Main Topics   • Smoking status: Former Smoker     Types: Cigarettes     Quit date: 1997   • Smokeless tobacco: Never Used      Comment: Smoked for 20 years and smoked more than 1 PPD.   • Alcohol use No   • Drug use: No   • Sexual activity: No     Other Topics Concern   • Not on file     Social History Narrative       Family History:    Family History   Problem Relation Age of Onset   • Kidney disease Mother    • Other Mother      heart attack   • Colon cancer Father        Health Maintenance:    Health Maintenance   Topic Date Due   • TDAP/TD VACCINES (1 - Tdap) 01/28/1962   • PNEUMOCOCCAL VACCINES (65+ LOW/MEDIUM RISK) (1 of 2 - PCV13) 01/28/2008   • MEDICARE ANNUAL WELLNESS  12/08/2016   • LIPID PANEL  12/08/2016   • DXA SCAN  12/08/2016   • COLONOSCOPY  12/08/2016   • ZOSTER VACCINE  12/08/2016   •  INFLUENZA VACCINE  08/01/2017   • MAMMOGRAM  01/25/2019        Review of Systems   Constitutional: Positive for fatigue. Negative for appetite change, chills, fever and unexpected weight change.   HENT: Positive for sore throat. Negative for congestion, hearing loss and trouble swallowing.    Eyes: Negative for redness and visual disturbance.   Respiratory: Positive for shortness of breath. Negative for cough, wheezing and stridor.    Cardiovascular: Negative for chest pain, palpitations and leg swelling.   Gastrointestinal: Positive for constipation. Negative for abdominal distention, abdominal pain, blood in stool, diarrhea, nausea and vomiting.   Endocrine: Negative.  Negative for cold intolerance and heat intolerance.   Genitourinary: Positive for flank pain. Negative for difficulty urinating, dyspareunia, dysuria, frequency, genital sores, hematuria, pelvic pain, urgency, vaginal bleeding, vaginal discharge and vaginal pain.   Musculoskeletal: Positive for back pain and myalgias. Negative for arthralgias, gait problem, joint swelling and neck pain.   Skin: Negative for rash and wound.   Allergic/Immunologic: Negative for food allergies and immunocompromised state.   Neurological: Positive for weakness. Negative for dizziness, seizures, syncope, light-headedness, numbness and headaches.   Hematological: Negative for adenopathy. Bruises/bleeds easily.   Psychiatric/Behavioral: Negative for agitation, confusion, dysphoric mood, hallucinations and sleep disturbance. The patient is nervous/anxious.        Physical Exam    Vitals:    07/06/17 1103   BP: 111/63   Pulse: 93   Resp: (!) 32   Temp: 97.7 °F (36.5 °C)   TempSrc: Temporal Artery    SpO2: (!) 84%   Weight: Comment: pt could not stand, in pain     There is no height or weight on file to calculate BMI.      GENERAL: Alert, ill female appearing her stated age who is in no apparent distress. Nasal cannula in place. Has portable O2 with her.  HEENT: Sclera  anicteric. Head normocephalic, atraumatic. Mucus membranes moist.   NECK: Trachea midline, supple, without masses.  No thyromegaly.   BREASTS: Deferred  CARDIOVASCULAR: Normal rate, regular rhythm, no murmurs, rubs, or gallops. No peripheral edema.  RESPIRATORY: Clear to auscultation bilaterally, normal respiratory effort  BACK:  No CVA tenderness, Paraspinous muscles tender to palpation. Tenderness over right lower rib area laterally. Bruise noted on right flank.  GASTROINTESTINAL:  Abdomen is soft, non-tender, non-distended, no rebound or guarding, no masses, or hernias. No HSM.    SKIN:  Warm, dry, well-perfused.  All visible areas intact.  No rashes, lesions, ulcers.  PSYCHIATRIC: AO x3, with appropriate affect, normal thought processes.  NEUROLOGIC: No focal deficits. Moves extremities slowly but well.  MUSCULOSKELETAL: Normal gait and station.   EXTREMITIES:   No cyanosis, clubbing, symmetric.  LYMPHATICS:  No cervical or inguinal adenopathy noted.    Diagnostic Data:      WBC   Date Value Ref Range Status   07/06/2017 9.40 3.50 - 10.80 10*3/mm3 Final     RBC   Date Value Ref Range Status   07/06/2017 3.10 (L) 3.89 - 5.14 10*6/mm3 Final     Hemoglobin   Date Value Ref Range Status   07/06/2017 9.8 (L) 11.5 - 15.5 g/dL Final     Hematocrit   Date Value Ref Range Status   07/06/2017 31.0 (L) 34.5 - 44.0 % Final     MCV   Date Value Ref Range Status   07/06/2017 100.0 (H) 80.0 - 99.0 fL Final     MCH   Date Value Ref Range Status   07/06/2017 31.8 (H) 27.0 - 31.0 pg Final     MCHC   Date Value Ref Range Status   07/06/2017 31.8 (L) 32.0 - 36.0 g/dL Final     RDW   Date Value Ref Range Status   07/06/2017 23.3 (H) 11.3 - 14.5 % Final     RDW-SD   Date Value Ref Range Status   06/26/2017 70.5 (H) 37.0 - 54.0 fl Final     MPV   Date Value Ref Range Status   07/06/2017 9.0 6.0 - 12.0 fL Final     Platelets   Date Value Ref Range Status   07/06/2017 117 (L) 150 - 450 10*3/mm3 Final     Neutrophil %   Date Value Ref  Range Status   07/06/2017 82.5 (H) 41.0 - 71.0 % Final     Lymphocyte %   Date Value Ref Range Status   07/06/2017 14.7 (L) 24.0 - 44.0 % Final     Monocyte %   Date Value Ref Range Status   07/06/2017 2.8 0.0 - 12.0 % Final     Eosinophil %   Date Value Ref Range Status   06/26/2017 0.2 0.0 - 3.0 % Final     Basophil %   Date Value Ref Range Status   06/26/2017 0.2 0.0 - 1.0 % Final     Immature Grans %   Date Value Ref Range Status   06/26/2017 0.6 0.0 - 0.6 % Final     Neutrophils, Absolute   Date Value Ref Range Status   07/06/2017 7.80 1.50 - 8.30 10*3/mm3 Final     Lymphocytes, Absolute   Date Value Ref Range Status   07/06/2017 1.40 0.60 - 4.80 10*3/mm3 Final     Monocytes, Absolute   Date Value Ref Range Status   07/06/2017 0.30 0.00 - 1.00 10*3/mm3 Final     Eosinophils, Absolute   Date Value Ref Range Status   06/26/2017 0.01 (L) 0.10 - 0.30 10*3/mm3 Final     Basophils, Absolute   Date Value Ref Range Status   06/26/2017 0.01 0.00 - 0.20 10*3/mm3 Final     Immature Grans, Absolute   Date Value Ref Range Status   06/26/2017 0.03 0.00 - 0.03 10*3/mm3 Final     nRBC   Date Value Ref Range Status   06/25/2017 2.0 (H) 0.0 - 0.0 /100 WBC Final       Lab Results   Component Value Date     28.6 06/15/2017     21.8 05/25/2017     24.9 05/01/2017     10.6 01/25/2017     7.3 10/18/2016     9.0 07/19/2016     6.0 04/19/2016     5.4 01/19/2016     4.0 10/13/2015     9.8 05/20/2015   ]      Assessment/Plan   This is a 72 y.o. woman with recurrent ovarian cancer, s/p recent admission for chemotherapy induced pancytopenia, neutropenic fever, urinary tract infection, COPD exacerbation.    1- S/p Fall- Patient has tenderness over right flank near ribs. Will obtain CXR to evaluate for fracture. Also has paraspinal muscle tenderness and spasm like pain while in visit. Will give rx for flexaril and oxycodone for use PRN to get ahead of this pain.     2- Hypoxia- Pulse ox on  arrival 84% after walking in on her home O2 at 2L. She has known COPD and lung metastases. Placed patient on wall O2 in clinic at 3L and she improved to 97%. She was able to be converted back to her portable O2 at 3L and was 91%. CXR will evaluate respiratory status. Pain control to allow for improved breathing. No increased WOB. No tachypnea after turning up O2. Patient's family was instructed to keep O2 above 90%. They will check periodically.     3- Recent neutropenia, fall on anticoagulation with ecchymosis- will check CBC today    4. Constipation- Discussed PRN miralax to improve bowel function    Patient will call tomorrow for results and will return in 1 month for office visit.     FOLLOW UP: Return for patient is to call for results.    Note: Speech recognition transcription software was used to dictate portions of this document.  An attempt at proofreading has been made though minor errors in transcription may still be present.  Please do not hesitate to call our office with any questions.    Patient was seen and examined with Dr. Russ,  resident, who performed portions of the examination and documentation for this patient's care under my direct supervision.    Electronically Signed by: Nitza Saavedra MD  Date: 7/6/2017

## 2017-08-03 PROBLEM — R11.0 NAUSEA: Status: ACTIVE | Noted: 2017-01-01

## 2017-08-04 NOTE — TELEPHONE ENCOUNTER
I called and notified pt of uti and antibiotic sent to her pharmacy.  She is to call back for final culture results.  Pt verbalized understanding.    08/10/17:  Pt's daughter Karissa called.  Informed her that her mother is on the correct antibiotic for uti.  She verbalized understanding.

## 2017-08-05 NOTE — PROGRESS NOTES
Karine VERDUZCO [facility]  [id number]  1945      Reason for visit:  Follow-up due to recurrent ovarian cancer, lung metastasis, ongoing symptom management    History of present illness:  The patient is a 72 y.o. year old female with recurrent ovarian cancer who presents for follow-up and ongoing symptom management.    Patient has been fairly stable.  She uses walker to walk.  She notes that her rib pain is improved.  She notes shortness of breath and uses oxygen at 3 L continuous.  She notes poor appetite but enjoys eating bologna.  She is doing her nebulized treatments every other day.  She does not like using the albuterol MDI as it causes tremors.  She notes occasional cough and small metastasis which is improved.  She takes Ultram at bedtime.  She takes antibiotic at bedtime although her nausea has improved.  She denies fever.  She notes some pheresis this morning.    Oncologic History:     Malignant neoplasm of both ovaries    12/16/2014 Imaging     TVUS for PMB showed mildly enlarged uterus, 18 mm endometrial stripe, and 14 cm right adnexal mass         1/19/2015 Surgery     RTLH/BSO with infracolic omentectomy, peritoneal biopsies, and appendectomy. 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 Stage IC grade 1 by final path.          - 5/20/2015 Chemotherapy     Cycle #5 of Carboplatin and dose dense paclitaxel. Chemo course significant for bone marrow suppression requiring transfusion and GC SF, facial trigeminal neuropathy exacerbation, and pulmonary emboli.          6/22/2015 Genetic Testing     All genes on GYNplus Panel through [facility] negative         7/9/2015 Imaging     CT scan negative for disease         7/19/2016 Imaging     CT chest/abdomen/pelvis for new GI complaints. Abdomen/pelvis negative, however, discovery of ill-defined new pulmonary mass in left upper lobe. Note: patient had recent  hospitalization for suspected pneumonia         8/16/2016 Biopsy     Left upper lobe transbronchial biopsy. Pathology consistent with recurrent mucinous adenocarcinoma.          9/26/2016 -  Radiation     Underwent CyberKnife x 1 treatment with Dr. Mariann Lomeli         4/13/2017 Imaging     IMPRESSION CT CHEST:  There has been significant progression of disease when  compared with 08/09/2016 specifically the left upper lobe mass noted on  the previous examination has trebled. There is also an increasing mass  in the right upper lobe, now measuring approximately 1.2 cm as well as a  mass in the right lower lobe which has also significantly increased in  size measuring 2.7 cm in size.  IMPRESSION CT ABDOMEN/PELVIS:  There is a 1.4 cm cystic mass adherent to the surface of the  inferomedial aspect of the right lobe of the liver. In retrospect this  was present on the previous examination of 08/01/2016, but not on  earlier examinations in 2015. Given the cystic nature of the original  neoplasm, this likely represents a small solitary metastasis.         5/4/2017 - 6/15/2017 Chemotherapy     Began Carbo/Taxotere every three weeks.  Underwent a Avastin with for cycle.  This is been held due to rectal bleeding.  Total of 3 cycles.  Interval imaging showed no change in disease and chemotherapy was discontinued.           Ovarian cancer    8/9/2016 Initial Diagnosis     Ovarian cancer            Past Medical History:   Diagnosis Date   • Anxiety    • Arthritis    • CHF (congestive heart failure)    • Colitis    • COPD (chronic obstructive pulmonary disease)    • Depression    • Diverticulitis    • Diverticulosis    • Emphysema lung    • Fractures    • H/O bladder problems    • H/O CT scan of chest 06/09/2015    CTA of the chest 6/09/2015, large acute pulmonary emboli, bilateral. RV dilatation, but hemodynamically stable.   • H/O echocardiogram 06/09/2015    Est EF 55-60%. RV moderately dilated. All valves are structurally and  functionally normal with no hemodynamically significant valve disease   • H/O: CVA (cerebrovascular accident)    • Heart murmur    • History of blood clots 06/2015    IN LEFT LEG AND BILATERAL LUNGS, TREATED WITH XARELTO    • History of transfusion    • Hypercholesteremia    • Osteopenia    • Ovarian cancer 01/2015    15 CHEMO TREATMENTS. 6/9/2015: Patient due for cycle 6 of chemo today however will be held secondary to patient's current pulmonary status.   • Pneumonia    • PONV (postoperative nausea and vomiting)    • Pulmonary emboli     Bliateral. Patient is on heparin drip and is on oxygen to keep oxygen saturations greater than 92%. Causing acute shortness of air.   • Staph infection     IN BLOOD STREAM HOSPITALIZED FROM MAY 19-JUNE2,2016, TREATED WITH ABX-VANCOMYCIN   • Stroke 1973    MILD WEAKNESS OF LEFT SIDE. History of CVA in 1970s with residual left hand numbness.   • Trigeminal neuralgia    • Urinary incontinence    • Wears dentures    • Wears glasses        Past Surgical History:   Procedure Laterality Date   • APPENDECTOMY     • BLADDER SLING MODIFIED, ANTERIOR AND POSTERIOR VAGINAL REPAIR      PLACED IN 2-2013, REMOVED IN 7/2013 D/T DEFECTIVE MESH AND INFECTION. Suburethral mesh sling placement and subsequent excision due to infection.   • BREAST LUMPECTOMY Right    • BREAST SURGERY Right 06/2005    LUMPECTOMY    • BRONCHOSCOPY N/A 8/16/2016    Procedure: MAURICE BRONCHOSCOPY WITH FLUORO;  Surgeon: Henrry Leo MD;  Location: [facility] ENDOSCOPY;  Service:    • CERVICAL CONIZATION     • COLONOSCOPY     • CYSTOCELE REPAIR  1976, 8-2008,     rectocele repair   • HYSTERECTOMY     • LAPAROTOMY OOPHERECTOMY     • OMENTECTOMY      Infracolic   • OOPHORECTOMY Right    • PORTACATH PLACEMENT     • RECTAL MASS EXCISION      Mesh   • TEETH EXTRACTION     • TOTAL ABDOMINAL HYSTERECTOMY WITH SALPINGO OOPHORECTOMY     • TUBAL ABDOMINAL LIGATION  1976       MEDICATIONS: The current medication list was reviewed  with the patient and updated in the EMR this date per the Medical Assistant. Medication dosages and frequencies were confirmed to be accurate.      Allergies:  is allergic to percocet [oxycodone-acetaminophen]; bactrim [sulfamethoxazole-trimethoprim]; penicillins; prednisone; and sulfa antibiotics.    Social History:   Social History     Social History   • Marital status:      Spouse name: N/A   • Number of children: N/A   • Years of education: N/A     Occupational History   • Retired      Social History Main Topics   • Smoking status: Former Smoker     Types: Cigarettes     Quit date: 1997   • Smokeless tobacco: Never Used      Comment: Smoked for 20 years and smoked more than 1 PPD.   • Alcohol use No   • Drug use: No   • Sexual activity: No     Other Topics Concern   • Not on file     Social History Narrative       Family History:    Family History   Problem Relation Age of Onset   • Kidney disease Mother    • Other Mother      heart attack   • Colon cancer Father        Health Maintenance:    Health Maintenance   Topic Date Due   • TDAP/TD VACCINES (1 - Tdap) 01/28/1962   • PNEUMOCOCCAL VACCINES (65+ LOW/MEDIUM RISK) (1 of 2 - PCV13) 01/28/2008   • MEDICARE ANNUAL WELLNESS  12/08/2016   • LIPID PANEL  12/08/2016   • DXA SCAN  12/08/2016   • COLONOSCOPY  12/08/2016   • ZOSTER VACCINE  12/08/2016   • INFLUENZA VACCINE  09/01/2017   • MAMMOGRAM  01/25/2019        Review of Systems   Constitutional: Positive for appetite change (poor appetite) and fatigue. Negative for chills, fever and unexpected weight change.   HENT: Positive for sore throat. Negative for congestion, hearing loss and trouble swallowing.    Eyes: Negative for redness and visual disturbance.   Respiratory: Positive for cough and shortness of breath. Negative for wheezing and stridor.    Cardiovascular: Negative for chest pain, palpitations and leg swelling.   Gastrointestinal: Positive for constipation. Negative for abdominal distention,  "abdominal pain, blood in stool, diarrhea, nausea and vomiting.   Endocrine: Negative.  Negative for cold intolerance and heat intolerance.   Genitourinary: Positive for flank pain. Negative for difficulty urinating, dyspareunia, dysuria, frequency, genital sores, hematuria, pelvic pain, urgency, vaginal bleeding, vaginal discharge and vaginal pain.   Musculoskeletal: Positive for back pain, gait problem (Uses walker for ambu) and myalgias. Negative for arthralgias, joint swelling and neck pain.   Skin: Negative for rash and wound.   Allergic/Immunologic: Negative for food allergies and immunocompromised state.   Neurological: Positive for weakness. Negative for dizziness, seizures, syncope, light-headedness, numbness and headaches.   Hematological: Negative for adenopathy. Bruises/bleeds easily.   Psychiatric/Behavioral: Negative for agitation, confusion, dysphoric mood, hallucinations and sleep disturbance. The patient is nervous/anxious.        Physical Exam    Vitals:    08/03/17 1421   BP: 121/65   Pulse: 72   Resp: 16   Temp: 97.6 °F (36.4 °C)   TempSrc: Temporal Artery    SpO2: (!) 86%   Weight: 204 lb 9.6 oz (92.8 kg)   Height: 66\" (167.6 cm)     Body mass index is 33.02 kg/(m^2).      GENERAL: Alert, ill female appearing her stated age who is in no apparent distress. Nasal cannula in place. Has portable O2 with her.  HEENT: Sclera anicteric. Head normocephalic, atraumatic. Mucus membranes moist.   NECK: Trachea midline, supple, without masses.  No thyromegaly.   BREASTS: Deferred  CARDIOVASCULAR: Normal rate, regular rhythm, no murmurs, rubs, or gallops. No peripheral edema.  RESPIRATORY: Clear to auscultation bilaterally, normal respiratory effort  BACK:  No CVA tenderness, Paraspinous muscles tender to palpation. Tenderness over right lower rib area laterally. Bruise noted on right flank.  GASTROINTESTINAL:  Abdomen is soft, non-tender, non-distended, no rebound or guarding, no masses, or hernias. No HSM.  "   SKIN:  Warm, dry, well-perfused.  All visible areas intact.  No rashes, lesions, ulcers.  PSYCHIATRIC: AO x3, with appropriate affect, normal thought processes.  NEUROLOGIC: No focal deficits. Moves extremities slowly but well.  MUSCULOSKELETAL: Normal gait and station.   EXTREMITIES:   No cyanosis, clubbing, symmetric.  LYMPHATICS:  No cervical or inguinal adenopathy noted.  Pelvic examination: Deferred    PS= 3    Diagnostic Data:      WBC   Date Value Ref Range Status   07/06/2017 9.40 3.50 - 10.80 10*3/mm3 Final     RBC   Date Value Ref Range Status   07/06/2017 3.10 (L) 3.89 - 5.14 10*6/mm3 Final     Hemoglobin   Date Value Ref Range Status   07/06/2017 9.8 (L) 11.5 - 15.5 g/dL Final     Hematocrit   Date Value Ref Range Status   07/06/2017 31.0 (L) 34.5 - 44.0 % Final     MCV   Date Value Ref Range Status   07/06/2017 100.0 (H) 80.0 - 99.0 fL Final     MCH   Date Value Ref Range Status   07/06/2017 31.8 (H) 27.0 - 31.0 pg Final     MCHC   Date Value Ref Range Status   07/06/2017 31.8 (L) 32.0 - 36.0 g/dL Final     RDW   Date Value Ref Range Status   07/06/2017 23.3 (H) 11.3 - 14.5 % Final     RDW-SD   Date Value Ref Range Status   06/26/2017 70.5 (H) 37.0 - 54.0 fl Final     MPV   Date Value Ref Range Status   07/06/2017 9.0 6.0 - 12.0 fL Final     Platelets   Date Value Ref Range Status   07/06/2017 117 (L) 150 - 450 10*3/mm3 Final     Neutrophil %   Date Value Ref Range Status   07/06/2017 82.5 (H) 41.0 - 71.0 % Final     Lymphocyte %   Date Value Ref Range Status   07/06/2017 14.7 (L) 24.0 - 44.0 % Final     Monocyte %   Date Value Ref Range Status   07/06/2017 2.8 0.0 - 12.0 % Final     Eosinophil %   Date Value Ref Range Status   06/26/2017 0.2 0.0 - 3.0 % Final     Basophil %   Date Value Ref Range Status   06/26/2017 0.2 0.0 - 1.0 % Final     Immature Grans %   Date Value Ref Range Status   06/26/2017 0.6 0.0 - 0.6 % Final     Neutrophils, Absolute   Date Value Ref Range Status   07/06/2017 7.80 1.50  - 8.30 10*3/mm3 Final     Lymphocytes, Absolute   Date Value Ref Range Status   07/06/2017 1.40 0.60 - 4.80 10*3/mm3 Final     Monocytes, Absolute   Date Value Ref Range Status   07/06/2017 0.30 0.00 - 1.00 10*3/mm3 Final     Eosinophils, Absolute   Date Value Ref Range Status   06/26/2017 0.01 (L) 0.10 - 0.30 10*3/mm3 Final     Basophils, Absolute   Date Value Ref Range Status   06/26/2017 0.01 0.00 - 0.20 10*3/mm3 Final     Immature Grans, Absolute   Date Value Ref Range Status   06/26/2017 0.03 0.00 - 0.03 10*3/mm3 Final     nRBC   Date Value Ref Range Status   06/25/2017 2.0 (H) 0.0 - 0.0 /100 WBC Final       Lab Results   Component Value Date     28.6 06/15/2017     21.8 05/25/2017     24.9 05/01/2017     10.6 01/25/2017     7.3 10/18/2016     9.0 07/19/2016     6.0 04/19/2016     5.4 01/19/2016     4.0 10/13/2015     9.8 05/20/2015   ]      Assessment/Plan   This is a 72 y.o. woman with recurrent ovarian cancer With bone metastasis.    Shortness of breath and hypoxemia  -Patient continues home oxygen.  Symptoms stable.  -Encourage nebulized treatments daily as opposed to albuterol MDI which patient does not tolerate due to side effects.  -Monitor occasional cough  -Bowel hemoptysis is still better than previous baseline    Rib pain due to fall  -No evidence of fracture on chest x-ray.  Pain improving.  On Ultram at bedtime.    Nausea  -Sometimes at nighttime.  Zofran and Phenergan when necessary.    Possible urinary tract infection due to nausea exacerbation and history of asymptomatic UTIs.  -Will check urinalysis      FOLLOW UP: Return in about 4 weeks (around 9/3/2017).    Note: Speech recognition transcription software was used to dictate portions of this document.  An attempt at proofreading has been made though minor errors in transcription may still be present.  Please do not hesitate to call our office with any questions.    Electronically Signed by:  Nitza Saavedra MD  Date: 8/4/2017

## 2017-09-19 NOTE — TELEPHONE ENCOUNTER
----- Message from Polo Perez sent at 9/19/2017 11:14 AM EDT -----  Regarding: MONET/MED REFILL REQUEST  Contact: [phone]  PATIENT CALLED TO REQUEST ALBUTEROL REFILL. SHE ASKED SOMEONE CALL HER BACK AT [phone] SHE SAID THERE IS A PROBLEM WITH THE ORDER AND THE INSURANCE WILL NOT COVER IT.        LCUIANO Ibrahim called and spoke with pt's pharmacist who said there was no issue with this prescription and he is happy to refill it today.  I called and spoke with him as well and he said the refill co-pay would be $3.70.  I called pt at the number she left with no answer.  I left message for her to call her pharmacy and her refill will be ready today and to call us back with any issues.

## 2017-09-19 NOTE — TELEPHONE ENCOUNTER
----- Message from Polo Perez sent at 9/19/2017  1:01 PM EDT -----  Regarding: wrong prescription being called in  Patients daughter Karissa said she had missed your call and that we are calling the wrong prescription in. Please call her back at [phone]      I called Karissa back and got her voicemail again.  I asked that she call back and leave all information about the medication that is being requested and informed that we did not call a new prescription in but inquired with her pharmacist about refills on current albuterol and cost.      I spoke with Karissa via phone. She requested duo-neb solution be refilled at [facility] with diagnoses attached, not albuterol inhaler.  Refill sent to [facility] with related diagnoses.

## 2017-09-28 PROBLEM — G50.0 TRIGEMINAL NEURALGIA OF RIGHT SIDE OF FACE: Status: ACTIVE | Noted: 2017-01-01

## 2017-09-28 PROBLEM — R39.89 URINARY PROBLEM: Status: ACTIVE | Noted: 2017-01-01

## 2017-09-28 NOTE — PROGRESS NOTES
Karine VERDUZCO [facility]  [id number]  1945      Reason for visit:  Follow-up due to recurrent ovarian cancer, lung metastasis, ongoing symptom management    History of present illness:  The patient is a 72 y.o. year old female with recurrent ovarian cancer who presents for follow-up and ongoing symptom management.    Today,  notes that she is on 3 L nasal cannula continuously except when she is noncompliant and she actually is feeling pretty well.  She is taking her albuterol nebs daily and this is helping her lie down better at night.  She is out of bed all day.  She's been increasing her activity summoned doing light housework.  She has not had her Port-A-Cath flushed for a few months now.  She would like to get her flu vaccination.  She has recurrent right-sided trigeminal neuralgia which is currently mild but his been very severe in the past.  She is trying to figure out what triggers it.  She has had a good response to gabapentin in the past but does not want to use this due to hypersomnolence associated with this drug.    Oncologic History:     Malignant neoplasm of both ovaries    12/16/2014 Imaging     TVUS for PMB showed mildly enlarged uterus, 18 mm endometrial stripe, and 14 cm right adnexal mass         1/19/2015 Surgery     RTLH/BSO with infracolic omentectomy, peritoneal biopsies, and appendectomy. 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 Stage IC grade 1 by final path.          - 5/20/2015 Chemotherapy     Cycle #5 of Carboplatin and dose dense paclitaxel. Chemo course significant for bone marrow suppression requiring transfusion and GC SF, facial trigeminal neuropathy exacerbation, and pulmonary emboli.          6/22/2015 Genetic Testing     All genes on GYNplus Panel through [employer] negative         7/9/2015 Imaging     CT scan negative for disease         7/19/2016 Imaging     CT  chest/abdomen/pelvis for new GI complaints. Abdomen/pelvis negative, however, discovery of ill-defined new pulmonary mass in left upper lobe. Note: patient had recent hospitalization for suspected pneumonia         8/16/2016 Biopsy     Left upper lobe transbronchial biopsy. Pathology consistent with recurrent mucinous adenocarcinoma.          9/26/2016 -  Radiation     Underwent CyberKnife x 1 treatment with Dr. Mariann Lomeli         4/13/2017 Imaging     IMPRESSION CT CHEST:  There has been significant progression of disease when  compared with 08/09/2016 specifically the left upper lobe mass noted on  the previous examination has trebled. There is also an increasing mass  in the right upper lobe, now measuring approximately 1.2 cm as well as a  mass in the right lower lobe which has also significantly increased in  size measuring 2.7 cm in size.  IMPRESSION CT ABDOMEN/PELVIS:  There is a 1.4 cm cystic mass adherent to the surface of the  inferomedial aspect of the right lobe of the liver. In retrospect this  was present on the previous examination of 08/01/2016, but not on  earlier examinations in 2015. Given the cystic nature of the original  neoplasm, this likely represents a small solitary metastasis.         5/4/2017 - 6/15/2017 Chemotherapy     Began Carbo/Taxotere every three weeks.  Underwent a Avastin with for cycle.  This is been held due to rectal bleeding.  Total of 3 cycles.  Interval imaging showed no change in disease and chemotherapy was discontinued.           Ovarian cancer    8/9/2016 Initial Diagnosis     Ovarian cancer            Past Medical History:   Diagnosis Date   • Anxiety    • Arthritis    • CHF (congestive heart failure)    • Colitis    • COPD (chronic obstructive pulmonary disease)    • Depression    • Diverticulitis    • Diverticulosis    • Emphysema lung    • Fractures    • H/O bladder problems    • H/O CT scan of chest 06/09/2015    CTA of the chest 6/09/2015, large acute pulmonary  emboli, bilateral. RV dilatation, but hemodynamically stable.   • H/O echocardiogram 06/09/2015    Est EF 55-60%. RV moderately dilated. All valves are structurally and functionally normal with no hemodynamically significant valve disease   • H/O: CVA (cerebrovascular accident)    • Heart murmur    • History of blood clots 06/2015    IN LEFT LEG AND BILATERAL LUNGS, TREATED WITH XARELTO    • History of transfusion    • Hypercholesteremia    • Osteopenia    • Ovarian cancer 01/2015    15 CHEMO TREATMENTS. 6/9/2015: Patient due for cycle 6 of chemo today however will be held secondary to patient's current pulmonary status.   • Pneumonia    • PONV (postoperative nausea and vomiting)    • Pulmonary emboli     Bliateral. Patient is on heparin drip and is on oxygen to keep oxygen saturations greater than 92%. Causing acute shortness of air.   • Staph infection     IN BLOOD STREAM HOSPITALIZED FROM MAY 19-JUNE2,2016, TREATED WITH ABX-VANCOMYCIN   • Stroke 1973    MILD WEAKNESS OF LEFT SIDE. History of CVA in 1970s with residual left hand numbness.   • Trigeminal neuralgia    • Urinary incontinence    • Wears dentures    • Wears glasses        Past Surgical History:   Procedure Laterality Date   • APPENDECTOMY     • BLADDER SLING MODIFIED, ANTERIOR AND POSTERIOR VAGINAL REPAIR      PLACED IN 2-2013, REMOVED IN 7/2013 D/T DEFECTIVE MESH AND INFECTION. Suburethral mesh sling placement and subsequent excision due to infection.   • BREAST LUMPECTOMY Right    • BREAST SURGERY Right 06/2005    LUMPECTOMY    • BRONCHOSCOPY N/A 8/16/2016    Procedure: MAURICE BRONCHOSCOPY WITH FLUORO;  Surgeon: Henrry Leo MD;  Location: [facility] ENDOSCOPY;  Service:    • CERVICAL CONIZATION     • COLONOSCOPY     • CYSTOCELE REPAIR  1976, 8-2008,     rectocele repair   • HYSTERECTOMY     • LAPAROTOMY OOPHERECTOMY     • OMENTECTOMY      Infracolic   • OOPHORECTOMY Right    • PORTACATH PLACEMENT     • RECTAL MASS EXCISION      Mesh   • TEETH  EXTRACTION     • TOTAL ABDOMINAL HYSTERECTOMY WITH SALPINGO OOPHORECTOMY     • TUBAL ABDOMINAL LIGATION  1976       MEDICATIONS: The current medication list was reviewed with the patient and updated in the EMR this date per the Medical Assistant. Medication dosages and frequencies were confirmed to be accurate.      Allergies:  is allergic to percocet [oxycodone-acetaminophen]; bactrim [sulfamethoxazole-trimethoprim]; penicillins; prednisone; and sulfa antibiotics.    Social History:   Social History     Social History   • Marital status:      Spouse name: N/A   • Number of children: N/A   • Years of education: N/A     Occupational History   • Retired      Social History Main Topics   • Smoking status: Former Smoker     Types: Cigarettes     Quit date: 1997   • Smokeless tobacco: Never Used      Comment: Smoked for 20 years and smoked more than 1 PPD.   • Alcohol use No   • Drug use: No   • Sexual activity: No     Other Topics Concern   • Not on file     Social History Narrative       Family History:    Family History   Problem Relation Age of Onset   • Kidney disease Mother    • Other Mother      heart attack   • Colon cancer Father        Health Maintenance:    Health Maintenance   Topic Date Due   • TDAP/TD VACCINES (1 - Tdap) 01/28/1962   • PNEUMOCOCCAL VACCINES (65+ LOW/MEDIUM RISK) (1 of 2 - PCV13) 01/28/2008   • MEDICARE ANNUAL WELLNESS  12/08/2016   • LIPID PANEL  12/08/2016   • DXA SCAN  12/08/2016   • COLONOSCOPY  12/08/2016   • ZOSTER VACCINE  12/08/2016   • INFLUENZA VACCINE  08/01/2017   • MAMMOGRAM  01/25/2019        Review of Systems   Constitutional: Positive for fatigue. Negative for appetite change, chills, fever and unexpected weight change.   HENT: Positive for sore throat. Negative for congestion, hearing loss and trouble swallowing.    Eyes: Negative for redness and visual disturbance.   Respiratory: Positive for cough and shortness of breath. Negative for wheezing and stridor.     Cardiovascular: Negative for chest pain, palpitations and leg swelling.   Gastrointestinal: Positive for constipation. Negative for abdominal distention, abdominal pain, blood in stool, diarrhea, nausea and vomiting.   Endocrine: Negative.  Negative for cold intolerance and heat intolerance.   Genitourinary: Negative for difficulty urinating, dyspareunia, dysuria, flank pain, frequency, genital sores, hematuria, pelvic pain, urgency, vaginal bleeding, vaginal discharge and vaginal pain.   Musculoskeletal: Positive for back pain, gait problem (Uses walker for ambu) and myalgias. Negative for arthralgias, joint swelling and neck pain.   Skin: Negative for rash and wound.   Allergic/Immunologic: Negative for food allergies and immunocompromised state.   Neurological: Positive for weakness. Negative for dizziness, seizures, syncope, light-headedness, numbness and headaches.   Hematological: Negative for adenopathy. Does not bruise/bleed easily.   Psychiatric/Behavioral: Negative for agitation, confusion, dysphoric mood, hallucinations and sleep disturbance. The patient is nervous/anxious.        Physical Exam    Vitals:    09/28/17 1440   BP: 119/71   Pulse: 92   Resp: 20   Temp: 97.8 °F (36.6 °C)   TempSrc: Temporal Artery    SpO2: 90%   Weight: 206 lb (93.4 kg)     Body mass index is 33.25 kg/(m^2).      GENERAL: Alert, ill female appearing her stated age who is in no apparent distress. Nasal cannula in place. Has portable O2 with her.  HEENT: Sclera anicteric. Head normocephalic, atraumatic. Mucus membranes moist.   NECK: Trachea midline, supple, without masses.  No thyromegaly.   BREASTS: Deferred  CARDIOVASCULAR: Normal rate, regular rhythm, no murmurs, rubs, or gallops. Trace right-sided lower extremity edema, 1+ left lower extremity edema.  RESPIRATORY: Clear to auscultation bilaterally, normal respiratory effort  BACK:  No CVA tenderness  GASTROINTESTINAL:  Abdomen is soft, non-tender, non-distended, no  rebound or guarding, no masses, or hernias. No HSM.    SKIN:  Warm, dry, well-perfused.  All visible areas intact.  No rashes, lesions, ulcers.  PSYCHIATRIC: AO x3, with appropriate affect, normal thought processes.  NEUROLOGIC: No focal deficits. Moves extremities slowly but well.  MUSCULOSKELETAL: Normal gait and station.   EXTREMITIES:   No cyanosis, clubbing, mildly asymmetric (chronic per patient).  LYMPHATICS:  No cervical or inguinal adenopathy noted.  Pelvic examination: Deferred    PS= 2    Diagnostic Data:      WBC   Date Value Ref Range Status   07/06/2017 9.40 3.50 - 10.80 10*3/mm3 Final     RBC   Date Value Ref Range Status   07/06/2017 3.10 (L) 3.89 - 5.14 10*6/mm3 Final     Hemoglobin   Date Value Ref Range Status   07/06/2017 9.8 (L) 11.5 - 15.5 g/dL Final     Hematocrit   Date Value Ref Range Status   07/06/2017 31.0 (L) 34.5 - 44.0 % Final     MCV   Date Value Ref Range Status   07/06/2017 100.0 (H) 80.0 - 99.0 fL Final     MCH   Date Value Ref Range Status   07/06/2017 31.8 (H) 27.0 - 31.0 pg Final     MCHC   Date Value Ref Range Status   07/06/2017 31.8 (L) 32.0 - 36.0 g/dL Final     RDW   Date Value Ref Range Status   07/06/2017 23.3 (H) 11.3 - 14.5 % Final     RDW-SD   Date Value Ref Range Status   06/26/2017 70.5 (H) 37.0 - 54.0 fl Final     MPV   Date Value Ref Range Status   07/06/2017 9.0 6.0 - 12.0 fL Final     Platelets   Date Value Ref Range Status   07/06/2017 117 (L) 150 - 450 10*3/mm3 Final     Neutrophil %   Date Value Ref Range Status   07/06/2017 82.5 (H) 41.0 - 71.0 % Final     Lymphocyte %   Date Value Ref Range Status   07/06/2017 14.7 (L) 24.0 - 44.0 % Final     Monocyte %   Date Value Ref Range Status   07/06/2017 2.8 0.0 - 12.0 % Final     Eosinophil %   Date Value Ref Range Status   06/26/2017 0.2 0.0 - 3.0 % Final     Basophil %   Date Value Ref Range Status   06/26/2017 0.2 0.0 - 1.0 % Final     Immature Grans %   Date Value Ref Range Status   06/26/2017 0.6 0.0 - 0.6 %  Final     Neutrophils, Absolute   Date Value Ref Range Status   07/06/2017 7.80 1.50 - 8.30 10*3/mm3 Final     Lymphocytes, Absolute   Date Value Ref Range Status   07/06/2017 1.40 0.60 - 4.80 10*3/mm3 Final     Monocytes, Absolute   Date Value Ref Range Status   07/06/2017 0.30 0.00 - 1.00 10*3/mm3 Final     Eosinophils, Absolute   Date Value Ref Range Status   06/26/2017 0.01 (L) 0.10 - 0.30 10*3/mm3 Final     Basophils, Absolute   Date Value Ref Range Status   06/26/2017 0.01 0.00 - 0.20 10*3/mm3 Final     Immature Grans, Absolute   Date Value Ref Range Status   06/26/2017 0.03 0.00 - 0.03 10*3/mm3 Final     nRBC   Date Value Ref Range Status   06/25/2017 2.0 (H) 0.0 - 0.0 /100 WBC Final       Lab Results   Component Value Date     28.6 06/15/2017     21.8 05/25/2017     24.9 05/01/2017     10.6 01/25/2017     7.3 10/18/2016     9.0 07/19/2016     6.0 04/19/2016     5.4 01/19/2016     4.0 10/13/2015     9.8 05/20/2015         Assessment/Plan   This is a 72 y.o. woman with recurrent ovarian cancer with lung metastasis.    Shortness of breath and hypoxemia  -Patient continues home oxygen.   -Encourage nebulized treatments daily as opposed to albuterol MDI which patient does not tolerate due to side effects.    Possible urinary tract infection due to intermittent decreased urinary output and history of asymptomatic UTIs.  -Will check urinalysis    PAC with not recent flush  -PAC flush ordered    Vaccination due  -patient to receive influenza vaccination today    Recurrent trigeminal neuralgia  -History of improvement with gabapentin.  However, patient is reluctant to go back on this medication due to somnolence on gabapentin.  We discussed options and patient agreed to a trial of Lyrica.      FOLLOW UP: Return in about 3 months (around 12/28/2017).    Note: Speech recognition transcription software was used to dictate portions of this document.  An attempt at  proofreading has been made though minor errors in transcription may still be present.  Please do not hesitate to call our office with any questions.    Electronically Signed by: Nitza Saavedra MD  Date: 9/28/2017

## 2017-10-02 NOTE — PROGRESS NOTES
PA sent for lyrica to insurance today. Waiting on response.       PA approved. Approval number [id number]    Effective 12/30/2016 to 12/30/2017    Faxed approval to pharmacy ([facility])

## 2017-10-10 NOTE — TELEPHONE ENCOUNTER
----- Message from Rosanna Vicente sent at 10/9/2017  3:07 PM EDT -----  Contact: Patient  Void message;     She said the the [employer] company called her today and said that she was not a patient of ours and they could not send her anymore incont. Supplies.     Patient said that she is in a wheelchair and has cancer, so she does not come to appointments. Wants to know if someone can contact the supply company and have them send her the supplies that she needs. Melinda and haja.    [facility] [phone] ext [phone] ask for Milvia Sykes mind, they are going to fax an order for supplies.

## 2017-10-13 NOTE — TELEPHONE ENCOUNTER
Milvia called again from [email or web address] in regards to pt needing a form signed to be able to get medical supplies. I explained as per Snehal WESTFALL, last encounter notes that we are still waiting on Dr. Galvez to sign that and as soon as he does we will fax the forms back.   Milvia stated she understood.     [employer] fax # [phone]

## 2017-11-27 NOTE — TELEPHONE ENCOUNTER
Karissa, pt's daughter called and stated that her mother was constipated despite taking stool softeners and miralax.  She has been having bowel movements but they are very small.  I advised that pt take adult dose of milk of magnesia and repeat in 2 hours if no results.  If still no results she may try using Fleet's enema.  Daughter agreed with verbalized understanding.  Karissa will call me in the morning if pt has not had bowel movement.

## 2017-12-19 NOTE — TELEPHONE ENCOUNTER
I called Karissa to inform her that requested refill with dose increase on gabapentin was called in to pt's pharmacy. No answer.  I left message.

## 2017-12-19 NOTE — TELEPHONE ENCOUNTER
Pt's daughter called yesterday and left message on my phone requesting refill on pt's gabapentin 300mg and requested dosage increase to 2 caps 5 times daily.  Inbox message sent to Dr. Saavedra.  Will call Karissa or ivana with a response.

## 2017-12-20 NOTE — TELEPHONE ENCOUNTER
Pt's daughter, Karissa, called and said her mother is having increased pelvic pain.  The pain does not seem to be related to bowel function whatsoever.  She wanted to know if pt can have CT scan the same day as her appt, 12/28/17.  Will discuss with Dr. Saavedra and someone from our office will get back with pt or her daughter.

## 2017-12-28 PROBLEM — R30.0 DYSURIA: Status: ACTIVE | Noted: 2017-01-01

## 2017-12-28 NOTE — PROGRESS NOTES
Karine VERDUZCO [facility]  [id number]  1945      Reason for visit:  Follow-up due to recurrent ovarian cancer, lung metastasis, ongoing symptom management, discussion of CT results    History of present illness:  The patient is a 72 y.o. year old female with recurrent ovarian cancer who presents for follow-up and ongoing symptom management.    Today, patient reports her symptoms are stable.  She continues to use oxygen on a continuous basis.  She is in a wheelchair today.  She is accompanied by her daughter as usual.  She does note abdominal discomfort for which she takes 2 tramadol pills a day.  This controls her pain well.  She does not require refill today.  She has urinary complaints and notes pull ups stay wet.  She thinks she has another UTI and has some dysuria.     Oncologic History:     Malignant neoplasm of both ovaries    12/16/2014 Imaging     TVUS for PMB showed mildly enlarged uterus, 18 mm endometrial stripe, and 14 cm right adnexal mass         1/19/2015 Surgery     RTLH/BSO with infracolic omentectomy, peritoneal biopsies, and appendectomy. 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 Stage IC grade 1 by final path.          - 5/20/2015 Chemotherapy     Cycle #5 of Carboplatin and dose dense paclitaxel. Chemo course significant for bone marrow suppression requiring transfusion and GC SF, facial trigeminal neuropathy exacerbation, and pulmonary emboli.          6/22/2015 Genetic Testing     All genes on GYNplus Panel through [employer] negative         7/9/2015 Imaging     CT scan negative for disease         7/19/2016 Imaging     CT chest/abdomen/pelvis for new GI complaints. Abdomen/pelvis negative, however, discovery of ill-defined new pulmonary mass in left upper lobe. Note: patient had recent hospitalization for suspected pneumonia         8/16/2016 Biopsy     Left upper lobe transbronchial biopsy.  Pathology consistent with recurrent mucinous adenocarcinoma.          9/26/2016 -  Radiation     Underwent CyberKnife x 1 treatment with Dr. Mariann Lomeli         4/13/2017 Imaging     IMPRESSION CT CHEST:  There has been significant progression of disease when  compared with 08/09/2016 specifically the left upper lobe mass noted on  the previous examination has trebled. There is also an increasing mass  in the right upper lobe, now measuring approximately 1.2 cm as well as a  mass in the right lower lobe which has also significantly increased in  size measuring 2.7 cm in size.  IMPRESSION CT ABDOMEN/PELVIS:  There is a 1.4 cm cystic mass adherent to the surface of the  inferomedial aspect of the right lobe of the liver. In retrospect this  was present on the previous examination of 08/01/2016, but not on  earlier examinations in 2015. Given the cystic nature of the original  neoplasm, this likely represents a small solitary metastasis.         5/4/2017 - 6/15/2017 Chemotherapy     Began Carbo/Taxotere every three weeks.  Underwent a Avastin with for cycle.  This is been held due to rectal bleeding.  Total of 3 cycles.  Interval imaging showed no change in disease and chemotherapy was discontinued.           Ovarian cancer    8/9/2016 Initial Diagnosis     Ovarian cancer            Past Medical History:   Diagnosis Date   • Anxiety    • Arthritis    • CHF (congestive heart failure)    • Colitis    • COPD (chronic obstructive pulmonary disease)    • Depression    • Diverticulitis    • Diverticulosis    • Emphysema lung    • Fractures    • H/O bladder problems    • H/O CT scan of chest 06/09/2015    CTA of the chest 6/09/2015, large acute pulmonary emboli, bilateral. RV dilatation, but hemodynamically stable.   • H/O echocardiogram 06/09/2015    Est EF 55-60%. RV moderately dilated. All valves are structurally and functionally normal with no hemodynamically significant valve disease   • H/O: CVA (cerebrovascular  accident)    • Heart murmur    • History of blood clots 06/2015    IN LEFT LEG AND BILATERAL LUNGS, TREATED WITH XARELTO    • History of transfusion    • Hypercholesteremia    • Osteopenia    • Ovarian cancer 01/2015    15 CHEMO TREATMENTS. 6/9/2015: Patient due for cycle 6 of chemo today however will be held secondary to patient's current pulmonary status.   • Pneumonia    • PONV (postoperative nausea and vomiting)    • Pulmonary emboli     Bliateral. Patient is on heparin drip and is on oxygen to keep oxygen saturations greater than 92%. Causing acute shortness of air.   • Staph infection     IN BLOOD STREAM HOSPITALIZED FROM MAY 19-JUNE2,2016, TREATED WITH ABX-VANCOMYCIN   • Stroke 1973    MILD WEAKNESS OF LEFT SIDE. History of CVA in 1970s with residual left hand numbness.   • Trigeminal neuralgia    • Urinary incontinence    • Wears dentures    • Wears glasses        Past Surgical History:   Procedure Laterality Date   • APPENDECTOMY     • BLADDER SLING MODIFIED, ANTERIOR AND POSTERIOR VAGINAL REPAIR      PLACED IN 2-2013, REMOVED IN 7/2013 D/T DEFECTIVE MESH AND INFECTION. Suburethral mesh sling placement and subsequent excision due to infection.   • BREAST LUMPECTOMY Right    • BREAST SURGERY Right 06/2005    LUMPECTOMY    • BRONCHOSCOPY N/A 8/16/2016    Procedure: MAURICE BRONCHOSCOPY WITH FLUORO;  Surgeon: Henrry Leo MD;  [facility];  Service:    • CERVICAL CONIZATION     • COLONOSCOPY     • CYSTOCELE REPAIR  1976, 8-2008,     rectocele repair   • HYSTERECTOMY     • LAPAROTOMY OOPHERECTOMY     • OMENTECTOMY      Infracolic   • OOPHORECTOMY Right    • PORTACATH PLACEMENT     • RECTAL MASS EXCISION      Mesh   • TEETH EXTRACTION     • TOTAL ABDOMINAL HYSTERECTOMY WITH SALPINGO OOPHORECTOMY     • TUBAL ABDOMINAL LIGATION  1976       MEDICATIONS: The current medication list was reviewed with the patient and updated in the EMR this date per the Medical Assistant. Medication dosages and  frequencies were confirmed to be accurate.      Allergies:  is allergic to percocet [oxycodone-acetaminophen]; bactrim [sulfamethoxazole-trimethoprim]; penicillins; prednisone; and sulfa antibiotics.    Social History:   Social History     Social History   • Marital status:      Spouse name: N/A   • Number of children: N/A   • Years of education: N/A     Occupational History   • Retired      Social History Main Topics   • Smoking status: Former Smoker     Types: Cigarettes     Quit date: 1997   • Smokeless tobacco: Never Used      Comment: Smoked for 20 years and smoked more than 1 PPD.   • Alcohol use No   • Drug use: No   • Sexual activity: No     Other Topics Concern   • Not on file     Social History Narrative       Family History:    Family History   Problem Relation Age of Onset   • Kidney disease Mother    • Other Mother      heart attack   • Colon cancer Father        Health Maintenance:    Health Maintenance   Topic Date Due   • TDAP/TD VACCINES (1 - Tdap) 01/28/1962   • PNEUMOCOCCAL VACCINES (65+ LOW/MEDIUM RISK) (1 of 2 - PCV13) 01/28/2008   • MEDICARE ANNUAL WELLNESS  12/08/2016   • LIPID PANEL  12/08/2016   • DXA SCAN  12/08/2016   • ZOSTER VACCINE  12/08/2016   • MAMMOGRAM  01/25/2019   • COLONOSCOPY  05/19/2025   • INFLUENZA VACCINE  Completed        Review of Systems   Constitutional: Positive for fatigue. Negative for appetite change, chills, fever and unexpected weight change.   HENT: Positive for sore throat. Negative for congestion, hearing loss and trouble swallowing.    Eyes: Negative for redness and visual disturbance.   Respiratory: Positive for cough and shortness of breath. Negative for wheezing and stridor.    Cardiovascular: Negative for chest pain, palpitations and leg swelling.   Gastrointestinal: Positive for abdominal pain and constipation. Negative for abdominal distention, blood in stool, diarrhea, nausea and vomiting.   Endocrine: Negative.  Negative for cold intolerance  and heat intolerance.   Genitourinary: Positive for dysuria and frequency. Negative for difficulty urinating, dyspareunia, flank pain, genital sores, hematuria, pelvic pain, urgency, vaginal bleeding, vaginal discharge and vaginal pain.   Musculoskeletal: Positive for back pain, gait problem (in wheelchair today) and myalgias. Negative for arthralgias, joint swelling and neck pain.   Skin: Negative for rash and wound.   Allergic/Immunologic: Negative for food allergies and immunocompromised state.   Neurological: Positive for weakness. Negative for dizziness, seizures, syncope, light-headedness, numbness and headaches.   Hematological: Negative for adenopathy. Does not bruise/bleed easily.   Psychiatric/Behavioral: Negative for agitation, confusion, dysphoric mood, hallucinations and sleep disturbance. The patient is nervous/anxious.        Physical Exam    Vitals:    12/28/17 1353   BP: 115/59   Pulse: 100   Resp: 20   Temp: 98.1 °F (36.7 °C)   TempSrc: Temporal Artery    SpO2: 93%   Weight: 88.5 kg (195 lb)     Body mass index is 31.47 kg/(m^2).      GENERAL: Alert, ill female appearing her stated age who is in no apparent distress. Nasal cannula in place. Has portable O2 with her.  Appears frail and somewhat cachectic.  HEENT: Sclera anicteric. Head normocephalic, atraumatic. Mucus membranes moist.   NECK: Deferred  BREASTS: Deferred  CARDIOVASCULAR: Deferred  RESPIRATORY: Deferred  BACK:  No CVA tenderness  GASTROINTESTINAL:  Abdomen is soft, non-tender, no rebound or guarding.  SKIN:  Warm, dry, well-perfused.  All visible areas intact.  No rashes, lesions, ulcers.  PSYCHIATRIC: AO x3, with appropriate affect, normal thought processes.  NEUROLOGIC: No focal deficits.  In wheelchair.   MUSCULOSKELETAL: In wheelchair  EXTREMITIES:   deferred  LYMPHATICS:  deferred  Pelvic examination: Deferred    PS= 3    Diagnostic Data:    12/28/2017    EXAMINATION: CT ABDOMEN AND PELVIS W CONTRAST-       INDICATION: Abdominal  pain; C56.9-Malignant neoplasm of unspecified  ovary; C78.00-Secondary malignant neoplasm of unspecified lung.       TECHNIQUE: CT scan of abdomen and pelvis was performed following oral  and intravenous contrast.      The radiation dose reduction device was turned on for each scan per the  ALARA (As Low as Reasonably Achievable) protocol.      COMPARISON: 04/13/2017.      FINDINGS: There is a loculated, septated fluid collection along the  lateral surface of the liver which is new. There is fairly extensive  omental disease, primarily in the right upper quadrant.  There is no  adrenal mass. The pancreas is normal. There is no renal mass or  obstruction. There is no aneurysm. There is no retroperitoneal  lymphadenopathy. There is no pelvic mass or fluid. There is no inguinal  lymphadenopathy.       IMPRESSION:  There has been progression of disease since 04/13/2017 in  the abdomen as manifest by septated loculated fluid collections lateral  to the liver and the presence of omental disease, primarily in the right  upper quadrant with associated small pleural effusion.    EXAMINATION: CT CHEST W CONTRAST-       INDICATION: C56.9-Malignant neoplasm of unspecified ovary;  C78.00-Secondary malignant neoplasm of unspecified lung.       TECHNIQUE: CT scan of the chest was performed following intravenous  contrast.      The radiation dose reduction device was turned on for each scan per the  ALARA (As Low as Reasonably Achievable) protocol.      COMPARISON: 06/23/2017.      FINDINGS: There is no axillary lymphadenopathy. There is a mass  radiating from the left hilum both anteriorly and posteriorly and there  is a left pleural effusion. There is no pericardial effusion.  In the  left upper lobe, there is a large area of consolidation which is  slightly larger than on the previous examination of 06/23/2017. There is  mild nodularity in the superior segment of the left lower lobe which is  also slightly more prominent and the  left pleural effusion is a new  finding. There is also consolidated nodular density in the left lower  lobe which represents progression of disease.  There is also coalescent  nodularity now in the right lower lobe which has progressed.      IMPRESSION:  There has been progression of pulmonary disease since  06/23/2017. There is a large coalescent process in the left upper lobe  which has increased in size. There is increasing nodularity in both  lower lobes and a new left pleural effusion when compared with the  previous examination.        Lab Results   Component Value Date     28.6 06/15/2017     21.8 05/25/2017     24.9 05/01/2017     10.6 01/25/2017     7.3 10/18/2016     9.0 07/19/2016     6.0 04/19/2016     5.4 01/19/2016     4.0 10/13/2015     9.8 05/20/2015         Assessment/Plan   This is a 72 y.o. woman with recurrent ovarian cancer with lung metastasis.    Patient was seen prior to CT scan reports being available.  I notified her of progression of disease in the lungs as well as airspace disease which is likely related to compression due to tumor tumor resulting in atelectasis.  Precautions regarding pneumonia were discussed.  Patient also has new left-sided small pleural effusion.  In the abdomen, there is a new perihepatic cystic mass. I referred to PET/CT from 4/2017 as a reference.  Although there is a large dominant perihepatic cystic mass, there also smaller areas suggestive of multifocal disease.  There were findings consistent with carcinomatosis with some prominence of the mesentery as well as omental caking.  This was confirmed by the final report is detailed above.    Patient is not a candidate for further treatment given her poor performance status and prognosis.  We agree that chemotherapy would do more harm than good at this point.  We discussed referral to hospice and patient declines hospice referral at this point.  Her daughter voiced  "concerns about taking care of her when \"things get bad\".  They were advised that hospice can come just as a home consultation and later becoming involved as needed or, less preferred, could be called as the patient becomes progressively worse on a more urgent basis.    Shortness of breath and hypoxemia  -Patient continues home oxygen.   -Encourage nebulized treatments daily as opposed to albuterol MDI which patient does not tolerate due to side effects.    Possible urinary tract infection due to intermittent decreased urinary output and history of asymptomatic UTIs.  Complaints of urinary frequency.  -Will check urinalysis again.  To call for results.    -PAC flush ordered for today    Recurrent trigeminal neuralgia; no complaints today.  -On gabapentin    FOLLOW UP: Return for on an as-needed basis.    Note: Speech recognition transcription software was used to dictate portions of this document.  An attempt at proofreading has been made though minor errors in transcription may still be present.  Please do not hesitate to call our office with any questions.    ADDENDUM:  UA appears c/w UTI.  Multiple allergies noted.  cipro erx.  I called Karine, daughter and left  re: UA and need to call back for sensitivities.     Electronically Signed by: Nitza Saavedra MD  Date: 12/28/2017         "

## 2017-12-29 NOTE — TELEPHONE ENCOUNTER
Called patient to let her know that her UA is suspect for infection and that the culture sensitivities will be back by Tuesday and for her to call. She v/u.       She did not receive dr. cevallos message, her phone has been messed up.

## 2018-01-01 ENCOUNTER — TELEPHONE (OUTPATIENT)
Dept: GYNECOLOGIC ONCOLOGY | Facility: CLINIC | Age: 73
End: 2018-01-01

## 2018-01-01 ENCOUNTER — HOSPITAL ENCOUNTER (EMERGENCY)
Facility: HOSPITAL | Age: 73
End: 2018-01-01

## 2018-01-01 ENCOUNTER — APPOINTMENT (OUTPATIENT)
Dept: CT IMAGING | Facility: HOSPITAL | Age: 73
End: 2018-01-01

## 2018-01-01 ENCOUNTER — DOCUMENTATION (OUTPATIENT)
Dept: GYNECOLOGIC ONCOLOGY | Facility: CLINIC | Age: 73
End: 2018-01-01

## 2018-01-01 ENCOUNTER — HOSPITAL ENCOUNTER (EMERGENCY)
Facility: HOSPITAL | Age: 73
End: 2018-01-26
Attending: EMERGENCY MEDICINE | Admitting: EMERGENCY MEDICINE

## 2018-01-01 ENCOUNTER — HOSPITAL ENCOUNTER (INPATIENT)
Facility: HOSPITAL | Age: 73
LOS: 12 days | End: 2018-02-07
Attending: INTERNAL MEDICINE | Admitting: INTERNAL MEDICINE

## 2018-01-01 VITALS
HEART RATE: 92 BPM | BODY MASS INDEX: 31.99 KG/M2 | TEMPERATURE: 99.3 F | HEIGHT: 65 IN | RESPIRATION RATE: 14 BRPM | WEIGHT: 192 LBS | SYSTOLIC BLOOD PRESSURE: 119 MMHG | OXYGEN SATURATION: 96 % | DIASTOLIC BLOOD PRESSURE: 72 MMHG

## 2018-01-01 VITALS
OXYGEN SATURATION: 95 % | WEIGHT: 193.78 LBS | HEIGHT: 65 IN | HEART RATE: 85 BPM | SYSTOLIC BLOOD PRESSURE: 90 MMHG | BODY MASS INDEX: 32.29 KG/M2 | RESPIRATION RATE: 8 BRPM | DIASTOLIC BLOOD PRESSURE: 51 MMHG | TEMPERATURE: 97.4 F

## 2018-01-01 DIAGNOSIS — K56.600 PARTIAL INTESTINAL OBSTRUCTION, UNSPECIFIED CAUSE (HCC): ICD-10-CM

## 2018-01-01 DIAGNOSIS — C56.3 MALIGNANT NEOPLASM OF BOTH OVARIES (HCC): ICD-10-CM

## 2018-01-01 DIAGNOSIS — E86.0 DEHYDRATION, MODERATE: ICD-10-CM

## 2018-01-01 DIAGNOSIS — C79.9 METASTATIC CANCER (HCC): Primary | ICD-10-CM

## 2018-01-01 LAB
ALBUMIN SERPL-MCNC: 3.6 G/DL (ref 3.2–4.8)
ALBUMIN/GLOB SERPL: 1 G/DL (ref 1.5–2.5)
ALP SERPL-CCNC: 223 U/L (ref 25–100)
ALT SERPL W P-5'-P-CCNC: 106 U/L (ref 7–40)
ANION GAP SERPL CALCULATED.3IONS-SCNC: 9 MMOL/L (ref 3–11)
AST SERPL-CCNC: 104 U/L (ref 0–33)
BACTERIA SPEC AEROBE CULT: ABNORMAL
BACTERIA SPEC AEROBE CULT: ABNORMAL
BACTERIA UR QL AUTO: ABNORMAL /HPF
BASOPHILS # BLD AUTO: 0.01 10*3/MM3 (ref 0–0.2)
BASOPHILS NFR BLD AUTO: 0.1 % (ref 0–1)
BILIRUB SERPL-MCNC: 1.2 MG/DL (ref 0.3–1.2)
BILIRUB UR QL STRIP: ABNORMAL
BUN BLD-MCNC: 20 MG/DL (ref 9–23)
BUN/CREAT SERPL: 25 (ref 7–25)
CALCIUM SPEC-SCNC: 8.6 MG/DL (ref 8.7–10.4)
CARBAMAZEPINE SERPL-MCNC: <0.3 MCG/ML (ref 4–10)
CHLORIDE SERPL-SCNC: 99 MMOL/L (ref 99–109)
CLARITY UR: ABNORMAL
CO2 SERPL-SCNC: 27 MMOL/L (ref 20–31)
COLOR UR: ABNORMAL
CREAT BLD-MCNC: 0.8 MG/DL (ref 0.6–1.3)
D-LACTATE SERPL-SCNC: 0.6 MMOL/L (ref 0.5–2)
DEPRECATED RDW RBC AUTO: 59.5 FL (ref 37–54)
EOSINOPHIL # BLD AUTO: 0.03 10*3/MM3 (ref 0–0.3)
EOSINOPHIL NFR BLD AUTO: 0.3 % (ref 0–3)
ERYTHROCYTE [DISTWIDTH] IN BLOOD BY AUTOMATED COUNT: 16.7 % (ref 11.3–14.5)
GFR SERPL CREATININE-BSD FRML MDRD: 70 ML/MIN/1.73
GLOBULIN UR ELPH-MCNC: 3.7 GM/DL
GLUCOSE BLD-MCNC: 102 MG/DL (ref 70–100)
GLUCOSE UR STRIP-MCNC: NEGATIVE MG/DL
HCT VFR BLD AUTO: 33.5 % (ref 34.5–44)
HGB BLD-MCNC: 10.4 G/DL (ref 11.5–15.5)
HGB UR QL STRIP.AUTO: NEGATIVE
HOLD SPECIMEN: NORMAL
HOLD SPECIMEN: NORMAL
HYALINE CASTS UR QL AUTO: ABNORMAL /LPF
IMM GRANULOCYTES # BLD: 0.03 10*3/MM3 (ref 0–0.03)
IMM GRANULOCYTES NFR BLD: 0.3 % (ref 0–0.6)
KETONES UR QL STRIP: ABNORMAL
LEUKOCYTE ESTERASE UR QL STRIP.AUTO: ABNORMAL
LIPASE SERPL-CCNC: 78 U/L (ref 6–51)
LYMPHOCYTES # BLD AUTO: 0.76 10*3/MM3 (ref 0.6–4.8)
LYMPHOCYTES NFR BLD AUTO: 7.7 % (ref 24–44)
MCH RBC QN AUTO: 30.2 PG (ref 27–31)
MCHC RBC AUTO-ENTMCNC: 31 G/DL (ref 32–36)
MCV RBC AUTO: 97.4 FL (ref 80–99)
MONOCYTES # BLD AUTO: 0.97 10*3/MM3 (ref 0–1)
MONOCYTES NFR BLD AUTO: 9.8 % (ref 0–12)
NEUTROPHILS # BLD AUTO: 8.09 10*3/MM3 (ref 1.5–8.3)
NEUTROPHILS NFR BLD AUTO: 81.8 % (ref 41–71)
NITRITE UR QL STRIP: POSITIVE
PH UR STRIP.AUTO: 5.5 [PH] (ref 5–8)
PLATELET # BLD AUTO: 319 10*3/MM3 (ref 150–450)
PMV BLD AUTO: 9.6 FL (ref 6–12)
POTASSIUM BLD-SCNC: 3.8 MMOL/L (ref 3.5–5.5)
PROT SERPL-MCNC: 7.3 G/DL (ref 5.7–8.2)
PROT UR QL STRIP: ABNORMAL
RBC # BLD AUTO: 3.44 10*6/MM3 (ref 3.89–5.14)
RBC # UR: ABNORMAL /HPF
REF LAB TEST METHOD: ABNORMAL
SODIUM BLD-SCNC: 135 MMOL/L (ref 132–146)
SP GR UR STRIP: 1.02 (ref 1–1.03)
SQUAMOUS #/AREA URNS HPF: ABNORMAL /HPF
TROPONIN I SERPL-MCNC: 0.01 NG/ML (ref 0–0.07)
UROBILINOGEN UR QL STRIP: ABNORMAL
WBC NRBC COR # BLD: 9.89 10*3/MM3 (ref 3.5–10.8)
WBC UR QL AUTO: ABNORMAL /HPF
WHOLE BLOOD HOLD SPECIMEN: NORMAL
WHOLE BLOOD HOLD SPECIMEN: NORMAL

## 2018-01-01 PROCEDURE — P9612 CATHETERIZE FOR URINE SPEC: HCPCS

## 2018-01-01 PROCEDURE — 87086 URINE CULTURE/COLONY COUNT: CPT | Performed by: EMERGENCY MEDICINE

## 2018-01-01 PROCEDURE — 94640 AIRWAY INHALATION TREATMENT: CPT

## 2018-01-01 PROCEDURE — 96374 THER/PROPH/DIAG INJ IV PUSH: CPT

## 2018-01-01 PROCEDURE — 83605 ASSAY OF LACTIC ACID: CPT | Performed by: EMERGENCY MEDICINE

## 2018-01-01 PROCEDURE — 25010000002 ONDANSETRON PER 1 MG: Performed by: NURSE PRACTITIONER

## 2018-01-01 PROCEDURE — 25010000002 HYDROMORPHONE PER 4 MG: Performed by: NURSE PRACTITIONER

## 2018-01-01 PROCEDURE — 25010000002 KETOROLAC TROMETHAMINE PER 15 MG: Performed by: NURSE PRACTITIONER

## 2018-01-01 PROCEDURE — 25010000002 PROMETHAZINE PER 50 MG: Performed by: NURSE PRACTITIONER

## 2018-01-01 PROCEDURE — 94799 UNLISTED PULMONARY SVC/PX: CPT

## 2018-01-01 PROCEDURE — 25010000002 HYDROMORPHONE PER 4 MG: Performed by: INTERNAL MEDICINE

## 2018-01-01 PROCEDURE — 25010000002 LORAZEPAM PER 2 MG: Performed by: FAMILY MEDICINE

## 2018-01-01 PROCEDURE — 96376 TX/PRO/DX INJ SAME DRUG ADON: CPT

## 2018-01-01 PROCEDURE — 25010000002 HALOPERIDOL LACTATE PER 5 MG: Performed by: INTERNAL MEDICINE

## 2018-01-01 PROCEDURE — 25010000002 FUROSEMIDE PER 20 MG: Performed by: FAMILY MEDICINE

## 2018-01-01 PROCEDURE — 25010000002 HYDROMORPHONE PER 4 MG: Performed by: EMERGENCY MEDICINE

## 2018-01-01 PROCEDURE — 80156 ASSAY CARBAMAZEPINE TOTAL: CPT | Performed by: EMERGENCY MEDICINE

## 2018-01-01 PROCEDURE — 25010000002 LORAZEPAM PER 2 MG: Performed by: NURSE PRACTITIONER

## 2018-01-01 PROCEDURE — 83690 ASSAY OF LIPASE: CPT | Performed by: EMERGENCY MEDICINE

## 2018-01-01 PROCEDURE — 94760 N-INVAS EAR/PLS OXIMETRY 1: CPT

## 2018-01-01 PROCEDURE — 71250 CT THORAX DX C-: CPT

## 2018-01-01 PROCEDURE — 84484 ASSAY OF TROPONIN QUANT: CPT

## 2018-01-01 PROCEDURE — 96375 TX/PRO/DX INJ NEW DRUG ADDON: CPT

## 2018-01-01 PROCEDURE — 25010000002 CEFTRIAXONE PER 250 MG: Performed by: INTERNAL MEDICINE

## 2018-01-01 PROCEDURE — 85025 COMPLETE CBC W/AUTO DIFF WBC: CPT | Performed by: EMERGENCY MEDICINE

## 2018-01-01 PROCEDURE — 25010000002 KETOROLAC TROMETHAMINE PER 15 MG: Performed by: INTERNAL MEDICINE

## 2018-01-01 PROCEDURE — 93005 ELECTROCARDIOGRAM TRACING: CPT

## 2018-01-01 PROCEDURE — 99201: CPT

## 2018-01-01 PROCEDURE — 25010000002 LORAZEPAM PER 2 MG: Performed by: INTERNAL MEDICINE

## 2018-01-01 PROCEDURE — 25010000002 FUROSEMIDE PER 20 MG: Performed by: INTERNAL MEDICINE

## 2018-01-01 PROCEDURE — 25010000002 HALOPERIDOL LACTATE PER 5 MG: Performed by: FAMILY MEDICINE

## 2018-01-01 PROCEDURE — 81001 URINALYSIS AUTO W/SCOPE: CPT | Performed by: EMERGENCY MEDICINE

## 2018-01-01 PROCEDURE — 25010000002 ONDANSETRON PER 1 MG: Performed by: EMERGENCY MEDICINE

## 2018-01-01 PROCEDURE — 74176 CT ABD & PELVIS W/O CONTRAST: CPT

## 2018-01-01 PROCEDURE — 87186 SC STD MICRODIL/AGAR DIL: CPT | Performed by: EMERGENCY MEDICINE

## 2018-01-01 PROCEDURE — 99284 EMERGENCY DEPT VISIT MOD MDM: CPT

## 2018-01-01 PROCEDURE — 87077 CULTURE AEROBIC IDENTIFY: CPT | Performed by: EMERGENCY MEDICINE

## 2018-01-01 PROCEDURE — 96361 HYDRATE IV INFUSION ADD-ON: CPT

## 2018-01-01 PROCEDURE — 80053 COMPREHEN METABOLIC PANEL: CPT | Performed by: EMERGENCY MEDICINE

## 2018-01-01 RX ORDER — MORPHINE SULFATE 20 MG/ML
2 SOLUTION ORAL
Status: DISCONTINUED | OUTPATIENT
Start: 2018-01-01 | End: 2018-01-01

## 2018-01-01 RX ORDER — SCOLOPAMINE TRANSDERMAL SYSTEM 1 MG/1
1 PATCH, EXTENDED RELEASE TRANSDERMAL
Status: DISCONTINUED | OUTPATIENT
Start: 2018-01-01 | End: 2018-02-08 | Stop reason: HOSPADM

## 2018-01-01 RX ORDER — ONDANSETRON 2 MG/ML
4 INJECTION INTRAMUSCULAR; INTRAVENOUS EVERY 6 HOURS PRN
Status: DISCONTINUED | OUTPATIENT
Start: 2018-01-01 | End: 2018-02-08 | Stop reason: HOSPADM

## 2018-01-01 RX ORDER — KETOROLAC TROMETHAMINE 15 MG/ML
15 INJECTION, SOLUTION INTRAMUSCULAR; INTRAVENOUS EVERY 6 HOURS SCHEDULED
Status: DISPENSED | OUTPATIENT
Start: 2018-01-01 | End: 2018-01-01

## 2018-01-01 RX ORDER — GLYCOPYRROLATE 0.2 MG/ML
0.1 INJECTION INTRAMUSCULAR; INTRAVENOUS EVERY 4 HOURS PRN
Status: DISCONTINUED | OUTPATIENT
Start: 2018-01-01 | End: 2018-01-01

## 2018-01-01 RX ORDER — HYDROMORPHONE HYDROCHLORIDE 1 MG/ML
0.5 INJECTION, SOLUTION INTRAMUSCULAR; INTRAVENOUS; SUBCUTANEOUS
Status: CANCELLED | OUTPATIENT
Start: 2018-01-01

## 2018-01-01 RX ORDER — FAMOTIDINE 20 MG/1
20 TABLET, FILM COATED ORAL 2 TIMES DAILY
Status: DISCONTINUED | OUTPATIENT
Start: 2018-01-01 | End: 2018-01-01

## 2018-01-01 RX ORDER — HYDROMORPHONE HYDROCHLORIDE 1 MG/ML
0.25 INJECTION, SOLUTION INTRAMUSCULAR; INTRAVENOUS; SUBCUTANEOUS EVERY 6 HOURS
Status: DISCONTINUED | OUTPATIENT
Start: 2018-01-01 | End: 2018-02-08 | Stop reason: HOSPADM

## 2018-01-01 RX ORDER — HYDROMORPHONE HYDROCHLORIDE 1 MG/ML
0.25 INJECTION, SOLUTION INTRAMUSCULAR; INTRAVENOUS; SUBCUTANEOUS
Status: DISCONTINUED | OUTPATIENT
Start: 2018-01-01 | End: 2018-01-01

## 2018-01-01 RX ORDER — GLYCOPYRROLATE 0.2 MG/ML
0.4 INJECTION INTRAMUSCULAR; INTRAVENOUS EVERY 4 HOURS
Status: DISCONTINUED | OUTPATIENT
Start: 2018-01-01 | End: 2018-01-01

## 2018-01-01 RX ORDER — KETOROLAC TROMETHAMINE 30 MG/ML
15 INJECTION, SOLUTION INTRAMUSCULAR; INTRAVENOUS EVERY 6 HOURS PRN
Status: DISCONTINUED | OUTPATIENT
Start: 2018-01-01 | End: 2018-01-01

## 2018-01-01 RX ORDER — PANTOPRAZOLE SODIUM 40 MG/10ML
40 INJECTION, POWDER, LYOPHILIZED, FOR SOLUTION INTRAVENOUS EVERY 12 HOURS SCHEDULED
Status: DISCONTINUED | OUTPATIENT
Start: 2018-01-01 | End: 2018-02-08 | Stop reason: HOSPADM

## 2018-01-01 RX ORDER — ATROPINE SULFATE 10 MG/ML
2 SOLUTION/ DROPS OPHTHALMIC EVERY 6 HOURS PRN
Status: DISCONTINUED | OUTPATIENT
Start: 2018-01-01 | End: 2018-02-08 | Stop reason: HOSPADM

## 2018-01-01 RX ORDER — KETOROLAC TROMETHAMINE 30 MG/ML
15 INJECTION, SOLUTION INTRAMUSCULAR; INTRAVENOUS EVERY 6 HOURS SCHEDULED
Status: COMPLETED | OUTPATIENT
Start: 2018-01-01 | End: 2018-01-01

## 2018-01-01 RX ORDER — KETOROLAC TROMETHAMINE 15 MG/ML
15 INJECTION, SOLUTION INTRAMUSCULAR; INTRAVENOUS EVERY 6 HOURS PRN
Status: DISPENSED | OUTPATIENT
Start: 2018-01-01 | End: 2018-01-01

## 2018-01-01 RX ORDER — HALOPERIDOL 5 MG/ML
1 INJECTION INTRAMUSCULAR EVERY 6 HOURS PRN
Status: DISCONTINUED | OUTPATIENT
Start: 2018-01-01 | End: 2018-01-01

## 2018-01-01 RX ORDER — PROMETHAZINE HYDROCHLORIDE 25 MG/1
25 TABLET ORAL EVERY 6 HOURS PRN
Qty: 30 TABLET | Refills: 2 | Status: SHIPPED | OUTPATIENT
Start: 2018-01-01

## 2018-01-01 RX ORDER — MORPHINE SULFATE 20 MG/ML
6 SOLUTION ORAL
Status: DISCONTINUED | OUTPATIENT
Start: 2018-01-01 | End: 2018-01-01

## 2018-01-01 RX ORDER — ONDANSETRON 2 MG/ML
4 INJECTION INTRAMUSCULAR; INTRAVENOUS EVERY 6 HOURS
Status: DISCONTINUED | OUTPATIENT
Start: 2018-01-01 | End: 2018-01-01

## 2018-01-01 RX ORDER — ONDANSETRON 2 MG/ML
4 INJECTION INTRAMUSCULAR; INTRAVENOUS
Status: COMPLETED | OUTPATIENT
Start: 2018-01-01 | End: 2018-01-01

## 2018-01-01 RX ORDER — SODIUM CHLORIDE 9 MG/ML
100 INJECTION, SOLUTION INTRAVENOUS CONTINUOUS
Status: DISCONTINUED | OUTPATIENT
Start: 2018-01-01 | End: 2018-01-01

## 2018-01-01 RX ORDER — FAMOTIDINE 10 MG/ML
20 INJECTION, SOLUTION INTRAVENOUS EVERY 12 HOURS SCHEDULED
Status: DISCONTINUED | OUTPATIENT
Start: 2018-01-01 | End: 2018-01-01

## 2018-01-01 RX ORDER — LORAZEPAM 0.5 MG/1
0.5 TABLET ORAL EVERY 6 HOURS PRN
Status: DISCONTINUED | OUTPATIENT
Start: 2018-01-01 | End: 2018-01-01

## 2018-01-01 RX ORDER — HALOPERIDOL 5 MG/ML
1 INJECTION INTRAMUSCULAR
Status: DISCONTINUED | OUTPATIENT
Start: 2018-01-01 | End: 2018-02-08 | Stop reason: HOSPADM

## 2018-01-01 RX ORDER — OXYCODONE HYDROCHLORIDE AND ACETAMINOPHEN 5; 325 MG/1; MG/1
1 TABLET ORAL EVERY 4 HOURS PRN
Status: DISCONTINUED | OUTPATIENT
Start: 2018-01-01 | End: 2018-01-01

## 2018-01-01 RX ORDER — BENZONATATE 100 MG/1
100 CAPSULE ORAL 3 TIMES DAILY PRN
Status: DISCONTINUED | OUTPATIENT
Start: 2018-01-01 | End: 2018-02-08 | Stop reason: HOSPADM

## 2018-01-01 RX ORDER — KETOROLAC TROMETHAMINE 15 MG/ML
15 INJECTION, SOLUTION INTRAMUSCULAR; INTRAVENOUS EVERY 6 HOURS SCHEDULED
Status: DISCONTINUED | OUTPATIENT
Start: 2018-01-01 | End: 2018-01-01

## 2018-01-01 RX ORDER — HYDROMORPHONE HYDROCHLORIDE 1 MG/ML
0.25 INJECTION, SOLUTION INTRAMUSCULAR; INTRAVENOUS; SUBCUTANEOUS
Status: DISCONTINUED | OUTPATIENT
Start: 2018-01-01 | End: 2018-01-01 | Stop reason: SDUPTHER

## 2018-01-01 RX ORDER — OXYCODONE HYDROCHLORIDE AND ACETAMINOPHEN 5; 325 MG/1; MG/1
1 TABLET ORAL EVERY 6 HOURS PRN
Qty: 30 TABLET | Refills: 0 | Status: SHIPPED | OUTPATIENT
Start: 2018-01-01

## 2018-01-01 RX ORDER — GABAPENTIN 300 MG/1
600 CAPSULE ORAL 4 TIMES DAILY
Status: DISCONTINUED | OUTPATIENT
Start: 2018-01-01 | End: 2018-01-01

## 2018-01-01 RX ORDER — KETOROLAC TROMETHAMINE 15 MG/ML
15 INJECTION, SOLUTION INTRAMUSCULAR; INTRAVENOUS EVERY 6 HOURS SCHEDULED
Status: DISCONTINUED | OUTPATIENT
Start: 2018-01-01 | End: 2018-01-01 | Stop reason: SDUPTHER

## 2018-01-01 RX ORDER — LORAZEPAM 2 MG/ML
0.25 INJECTION INTRAMUSCULAR EVERY 6 HOURS PRN
Status: DISCONTINUED | OUTPATIENT
Start: 2018-01-01 | End: 2018-01-01

## 2018-01-01 RX ORDER — LORAZEPAM 2 MG/ML
0.5 INJECTION INTRAMUSCULAR EVERY 4 HOURS PRN
Status: DISCONTINUED | OUTPATIENT
Start: 2018-01-01 | End: 2018-02-08 | Stop reason: HOSPADM

## 2018-01-01 RX ORDER — HYDROMORPHONE HYDROCHLORIDE 1 MG/ML
0.25 INJECTION, SOLUTION INTRAMUSCULAR; INTRAVENOUS; SUBCUTANEOUS NIGHTLY
Status: DISCONTINUED | OUTPATIENT
Start: 2018-01-01 | End: 2018-01-01

## 2018-01-01 RX ORDER — HYDROMORPHONE HYDROCHLORIDE 1 MG/ML
0.25 INJECTION, SOLUTION INTRAMUSCULAR; INTRAVENOUS; SUBCUTANEOUS
Status: DISCONTINUED | OUTPATIENT
Start: 2018-01-01 | End: 2018-02-08 | Stop reason: HOSPADM

## 2018-01-01 RX ORDER — SODIUM CHLORIDE 9 MG/ML
125 INJECTION, SOLUTION INTRAVENOUS CONTINUOUS
Status: DISCONTINUED | OUTPATIENT
Start: 2018-01-01 | End: 2018-01-01 | Stop reason: HOSPADM

## 2018-01-01 RX ORDER — SODIUM CHLORIDE 0.9 % (FLUSH) 0.9 %
10 SYRINGE (ML) INJECTION AS NEEDED
Status: CANCELLED | OUTPATIENT
Start: 2018-01-01

## 2018-01-01 RX ORDER — SODIUM CHLORIDE 9 MG/ML
125 INJECTION, SOLUTION INTRAVENOUS CONTINUOUS
Status: CANCELLED | OUTPATIENT
Start: 2018-01-01

## 2018-01-01 RX ORDER — GLYCOPYRROLATE 0.2 MG/ML
0.4 INJECTION INTRAMUSCULAR; INTRAVENOUS EVERY 4 HOURS
Status: DISCONTINUED | OUTPATIENT
Start: 2018-01-01 | End: 2018-02-08 | Stop reason: HOSPADM

## 2018-01-01 RX ORDER — GLYCOPYRROLATE 0.2 MG/ML
0.4 INJECTION INTRAMUSCULAR; INTRAVENOUS EVERY 4 HOURS PRN
Status: DISCONTINUED | OUTPATIENT
Start: 2018-01-01 | End: 2018-02-08 | Stop reason: HOSPADM

## 2018-01-01 RX ORDER — HYDROMORPHONE HYDROCHLORIDE 1 MG/ML
0.25 INJECTION, SOLUTION INTRAMUSCULAR; INTRAVENOUS; SUBCUTANEOUS EVERY 6 HOURS
Status: DISCONTINUED | OUTPATIENT
Start: 2018-01-01 | End: 2018-01-01

## 2018-01-01 RX ORDER — LORAZEPAM 2 MG/ML
0.5 INJECTION INTRAMUSCULAR EVERY 6 HOURS PRN
Status: DISCONTINUED | OUTPATIENT
Start: 2018-01-01 | End: 2018-01-01

## 2018-01-01 RX ORDER — HALOPERIDOL 5 MG/ML
1 INJECTION INTRAMUSCULAR EVERY 6 HOURS
Status: DISCONTINUED | OUTPATIENT
Start: 2018-01-01 | End: 2018-01-01

## 2018-01-01 RX ORDER — HYDROMORPHONE HYDROCHLORIDE 1 MG/ML
0.5 INJECTION, SOLUTION INTRAMUSCULAR; INTRAVENOUS; SUBCUTANEOUS
Status: DISCONTINUED | OUTPATIENT
Start: 2018-01-01 | End: 2018-01-01 | Stop reason: SDUPTHER

## 2018-01-01 RX ORDER — HYDROMORPHONE HCL 110MG/55ML
0.25 PATIENT CONTROLLED ANALGESIA SYRINGE INTRAVENOUS EVERY 6 HOURS
Status: DISCONTINUED | OUTPATIENT
Start: 2018-01-01 | End: 2018-01-01 | Stop reason: SDUPTHER

## 2018-01-01 RX ORDER — HYDROMORPHONE HYDROCHLORIDE 1 MG/ML
0.5 INJECTION, SOLUTION INTRAMUSCULAR; INTRAVENOUS; SUBCUTANEOUS
Status: DISCONTINUED | OUTPATIENT
Start: 2018-01-01 | End: 2018-02-08 | Stop reason: HOSPADM

## 2018-01-01 RX ORDER — LORAZEPAM 2 MG/ML
0.5 INJECTION INTRAMUSCULAR EVERY 4 HOURS PRN
Status: DISCONTINUED | OUTPATIENT
Start: 2018-01-01 | End: 2018-01-01

## 2018-01-01 RX ORDER — HYDROMORPHONE HYDROCHLORIDE 1 MG/ML
0.5 INJECTION, SOLUTION INTRAMUSCULAR; INTRAVENOUS; SUBCUTANEOUS
Status: DISCONTINUED | OUTPATIENT
Start: 2018-01-01 | End: 2018-01-01

## 2018-01-01 RX ORDER — KETOROLAC TROMETHAMINE 15 MG/ML
15 INJECTION, SOLUTION INTRAMUSCULAR; INTRAVENOUS EVERY 8 HOURS SCHEDULED
Status: DISCONTINUED | OUTPATIENT
Start: 2018-01-01 | End: 2018-01-01

## 2018-01-01 RX ORDER — LORAZEPAM 0.5 MG/1
0.25 TABLET ORAL EVERY 6 HOURS PRN
Status: DISCONTINUED | OUTPATIENT
Start: 2018-01-01 | End: 2018-01-01

## 2018-01-01 RX ORDER — ECHINACEA PURPUREA EXTRACT 125 MG
2 TABLET ORAL 3 TIMES DAILY
Status: DISCONTINUED | OUTPATIENT
Start: 2018-01-01 | End: 2018-02-08 | Stop reason: HOSPADM

## 2018-01-01 RX ORDER — HALOPERIDOL 5 MG/ML
1 INJECTION INTRAMUSCULAR EVERY 4 HOURS PRN
Status: DISCONTINUED | OUTPATIENT
Start: 2018-01-01 | End: 2018-02-08 | Stop reason: HOSPADM

## 2018-01-01 RX ORDER — KETOROLAC TROMETHAMINE 30 MG/ML
15 INJECTION, SOLUTION INTRAMUSCULAR; INTRAVENOUS EVERY 6 HOURS SCHEDULED
Status: DISCONTINUED | OUTPATIENT
Start: 2018-01-01 | End: 2018-02-08 | Stop reason: HOSPADM

## 2018-01-01 RX ORDER — HALOPERIDOL 5 MG/ML
0.5 INJECTION INTRAMUSCULAR
Status: DISCONTINUED | OUTPATIENT
Start: 2018-01-01 | End: 2018-01-01

## 2018-01-01 RX ORDER — GABAPENTIN 300 MG/1
600 CAPSULE ORAL
Qty: 180 CAPSULE | Refills: 3 | Status: SHIPPED | OUTPATIENT
Start: 2018-01-01

## 2018-01-01 RX ORDER — FUROSEMIDE 10 MG/ML
20 INJECTION INTRAMUSCULAR; INTRAVENOUS ONCE
Status: COMPLETED | OUTPATIENT
Start: 2018-01-01 | End: 2018-01-01

## 2018-01-01 RX ORDER — CHOLECALCIFEROL (VITAMIN D3) 125 MCG
1 CAPSULE ORAL DAILY
COMMUNITY

## 2018-01-01 RX ORDER — PROMETHAZINE HYDROCHLORIDE 25 MG/ML
6.25 INJECTION, SOLUTION INTRAMUSCULAR; INTRAVENOUS EVERY 4 HOURS PRN
Status: DISCONTINUED | OUTPATIENT
Start: 2018-01-01 | End: 2018-02-08 | Stop reason: HOSPADM

## 2018-01-01 RX ORDER — SODIUM CHLORIDE 0.9 % (FLUSH) 0.9 %
10 SYRINGE (ML) INJECTION AS NEEDED
Status: DISCONTINUED | OUTPATIENT
Start: 2018-01-01 | End: 2018-01-01 | Stop reason: HOSPADM

## 2018-01-01 RX ORDER — HYDROMORPHONE HYDROCHLORIDE 1 MG/ML
0.5 INJECTION, SOLUTION INTRAMUSCULAR; INTRAVENOUS; SUBCUTANEOUS
Status: DISCONTINUED | OUTPATIENT
Start: 2018-01-01 | End: 2018-01-01 | Stop reason: HOSPADM

## 2018-01-01 RX ORDER — CEFTRIAXONE SODIUM 1 G/50ML
1 INJECTION, SOLUTION INTRAVENOUS EVERY 24 HOURS
Status: DISCONTINUED | OUTPATIENT
Start: 2018-01-01 | End: 2018-01-01

## 2018-01-01 RX ORDER — ACETAMINOPHEN 325 MG/1
650 TABLET ORAL EVERY 6 HOURS PRN
Status: DISCONTINUED | OUTPATIENT
Start: 2018-01-01 | End: 2018-02-08 | Stop reason: HOSPADM

## 2018-01-01 RX ORDER — SUCRALFATE 1 G/1
1 TABLET ORAL 4 TIMES DAILY PRN
Status: DISCONTINUED | OUTPATIENT
Start: 2018-01-01 | End: 2018-02-08 | Stop reason: HOSPADM

## 2018-01-01 RX ORDER — PANTOPRAZOLE SODIUM 40 MG/10ML
80 INJECTION, POWDER, LYOPHILIZED, FOR SOLUTION INTRAVENOUS ONCE
Status: COMPLETED | OUTPATIENT
Start: 2018-01-01 | End: 2018-01-01

## 2018-01-01 RX ORDER — IPRATROPIUM BROMIDE AND ALBUTEROL SULFATE 2.5; .5 MG/3ML; MG/3ML
3 SOLUTION RESPIRATORY (INHALATION)
Status: DISCONTINUED | OUTPATIENT
Start: 2018-01-01 | End: 2018-01-01

## 2018-01-01 RX ORDER — MORPHINE SULFATE 20 MG/ML
4 SOLUTION ORAL
Status: DISCONTINUED | OUTPATIENT
Start: 2018-01-01 | End: 2018-01-01

## 2018-01-01 RX ORDER — LACTULOSE 10 G/15ML
10 SOLUTION ORAL 2 TIMES DAILY PRN
Status: DISCONTINUED | OUTPATIENT
Start: 2018-01-01 | End: 2018-02-08 | Stop reason: HOSPADM

## 2018-01-01 RX ORDER — IPRATROPIUM BROMIDE AND ALBUTEROL SULFATE 2.5; .5 MG/3ML; MG/3ML
3 SOLUTION RESPIRATORY (INHALATION) EVERY 4 HOURS PRN
Status: DISCONTINUED | OUTPATIENT
Start: 2018-01-01 | End: 2018-02-08 | Stop reason: HOSPADM

## 2018-01-01 RX ADMIN — ONDANSETRON HYDROCHLORIDE 8 MG: 2 INJECTION, SOLUTION INTRAMUSCULAR; INTRAVENOUS at 04:22

## 2018-01-01 RX ADMIN — SALINE NASAL SPRAY 2 SPRAY: 1.5 SOLUTION NASAL at 15:37

## 2018-01-01 RX ADMIN — FAMOTIDINE 20 MG: 10 INJECTION, SOLUTION INTRAVENOUS at 21:58

## 2018-01-01 RX ADMIN — ONDANSETRON HYDROCHLORIDE 8 MG: 2 INJECTION, SOLUTION INTRAMUSCULAR; INTRAVENOUS at 04:02

## 2018-01-01 RX ADMIN — CEFTRIAXONE SODIUM 1 G: 1 INJECTION, SOLUTION INTRAVENOUS at 12:39

## 2018-01-01 RX ADMIN — PANTOPRAZOLE SODIUM 80 MG: 40 INJECTION, POWDER, FOR SOLUTION INTRAVENOUS at 17:37

## 2018-01-01 RX ADMIN — IPRATROPIUM BROMIDE AND ALBUTEROL SULFATE 3 ML: .5; 3 SOLUTION RESPIRATORY (INHALATION) at 15:45

## 2018-01-01 RX ADMIN — GABAPENTIN 600 MG: 300 CAPSULE ORAL at 23:57

## 2018-01-01 RX ADMIN — HALOPERIDOL LACTATE 1 MG: 5 INJECTION, SOLUTION INTRAMUSCULAR at 19:59

## 2018-01-01 RX ADMIN — GABAPENTIN 600 MG: 300 CAPSULE ORAL at 13:56

## 2018-01-01 RX ADMIN — GABAPENTIN 600 MG: 300 CAPSULE ORAL at 12:15

## 2018-01-01 RX ADMIN — IPRATROPIUM BROMIDE AND ALBUTEROL SULFATE 3 ML: .5; 3 SOLUTION RESPIRATORY (INHALATION) at 16:57

## 2018-01-01 RX ADMIN — FAMOTIDINE 20 MG: 10 INJECTION, SOLUTION INTRAVENOUS at 09:46

## 2018-01-01 RX ADMIN — HALOPERIDOL LACTATE 0.5 MG: 5 INJECTION, SOLUTION INTRAMUSCULAR at 11:59

## 2018-01-01 RX ADMIN — IPRATROPIUM BROMIDE AND ALBUTEROL SULFATE 3 ML: .5; 3 SOLUTION RESPIRATORY (INHALATION) at 07:48

## 2018-01-01 RX ADMIN — GLYCOPYRROLATE 0.4 MG: 0.2 INJECTION, SOLUTION INTRAMUSCULAR; INTRAVENOUS at 04:21

## 2018-01-01 RX ADMIN — LACTULOSE 10 G: 10 SOLUTION ORAL at 15:14

## 2018-01-01 RX ADMIN — GLYCOPYRROLATE 0.4 MG: 0.2 INJECTION, SOLUTION INTRAMUSCULAR; INTRAVENOUS at 11:56

## 2018-01-01 RX ADMIN — KETOROLAC TROMETHAMINE 15 MG: 15 INJECTION, SOLUTION INTRAMUSCULAR; INTRAVENOUS at 11:36

## 2018-01-01 RX ADMIN — PANTOPRAZOLE SODIUM 40 MG: 40 INJECTION, POWDER, FOR SOLUTION INTRAVENOUS at 20:48

## 2018-01-01 RX ADMIN — HALOPERIDOL LACTATE 1 MG: 5 INJECTION, SOLUTION INTRAMUSCULAR at 05:34

## 2018-01-01 RX ADMIN — KETOROLAC TROMETHAMINE 15 MG: 15 INJECTION, SOLUTION INTRAMUSCULAR; INTRAVENOUS at 11:57

## 2018-01-01 RX ADMIN — ONDANSETRON HYDROCHLORIDE 4 MG: 2 INJECTION, SOLUTION INTRAMUSCULAR; INTRAVENOUS at 21:58

## 2018-01-01 RX ADMIN — HYDROMORPHONE HYDROCHLORIDE 0.5 MG: 10 INJECTION, SOLUTION INTRAMUSCULAR; INTRAVENOUS; SUBCUTANEOUS at 08:47

## 2018-01-01 RX ADMIN — KETOROLAC TROMETHAMINE 15 MG: 15 INJECTION, SOLUTION INTRAMUSCULAR; INTRAVENOUS at 08:15

## 2018-01-01 RX ADMIN — HYDROMORPHONE HYDROCHLORIDE 0.25 MG: 2 INJECTION INTRAMUSCULAR; INTRAVENOUS; SUBCUTANEOUS at 23:58

## 2018-01-01 RX ADMIN — KETOROLAC TROMETHAMINE 15 MG: 30 INJECTION, SOLUTION INTRAMUSCULAR at 11:34

## 2018-01-01 RX ADMIN — HALOPERIDOL LACTATE 1 MG: 5 INJECTION, SOLUTION INTRAMUSCULAR at 16:19

## 2018-01-01 RX ADMIN — KETOROLAC TROMETHAMINE 15 MG: 15 INJECTION, SOLUTION INTRAMUSCULAR; INTRAVENOUS at 17:38

## 2018-01-01 RX ADMIN — IPRATROPIUM BROMIDE AND ALBUTEROL SULFATE 3 ML: .5; 3 SOLUTION RESPIRATORY (INHALATION) at 16:24

## 2018-01-01 RX ADMIN — HYDROGEN PEROXIDE 5 ML: 3 LIQUID TOPICAL at 15:54

## 2018-01-01 RX ADMIN — PANTOPRAZOLE SODIUM 40 MG: 40 INJECTION, POWDER, FOR SOLUTION INTRAVENOUS at 08:18

## 2018-01-01 RX ADMIN — KETOROLAC TROMETHAMINE 15 MG: 15 INJECTION, SOLUTION INTRAMUSCULAR; INTRAVENOUS at 03:00

## 2018-01-01 RX ADMIN — PANTOPRAZOLE SODIUM 40 MG: 40 INJECTION, POWDER, FOR SOLUTION INTRAVENOUS at 08:07

## 2018-01-01 RX ADMIN — KETOROLAC TROMETHAMINE 15 MG: 15 INJECTION, SOLUTION INTRAMUSCULAR; INTRAVENOUS at 06:07

## 2018-01-01 RX ADMIN — SALINE NASAL SPRAY 2 SPRAY: 1.5 SOLUTION NASAL at 16:35

## 2018-01-01 RX ADMIN — RIVAROXABAN 15 MG: 15 TABLET, FILM COATED ORAL at 21:10

## 2018-01-01 RX ADMIN — IPRATROPIUM BROMIDE AND ALBUTEROL SULFATE 3 ML: .5; 3 SOLUTION RESPIRATORY (INHALATION) at 16:09

## 2018-01-01 RX ADMIN — HALOPERIDOL LACTATE 1 MG: 5 INJECTION, SOLUTION INTRAMUSCULAR at 08:07

## 2018-01-01 RX ADMIN — ATROPINE SULFATE 2 DROP: 10 SOLUTION/ DROPS OPHTHALMIC at 14:54

## 2018-01-01 RX ADMIN — SALINE NASAL SPRAY 2 SPRAY: 1.5 SOLUTION NASAL at 16:28

## 2018-01-01 RX ADMIN — HALOPERIDOL LACTATE 0.5 MG: 5 INJECTION, SOLUTION INTRAMUSCULAR at 17:40

## 2018-01-01 RX ADMIN — FAMOTIDINE 20 MG: 20 TABLET, FILM COATED ORAL at 20:14

## 2018-01-01 RX ADMIN — ONDANSETRON 4 MG: 2 INJECTION INTRAMUSCULAR; INTRAVENOUS at 14:56

## 2018-01-01 RX ADMIN — HYDROMORPHONE HYDROCHLORIDE 0.25 MG: 10 INJECTION, SOLUTION INTRAMUSCULAR; INTRAVENOUS; SUBCUTANEOUS at 10:34

## 2018-01-01 RX ADMIN — SALINE NASAL SPRAY 2 SPRAY: 1.5 SOLUTION NASAL at 21:57

## 2018-01-01 RX ADMIN — ONDANSETRON HYDROCHLORIDE 8 MG: 2 INJECTION, SOLUTION INTRAMUSCULAR; INTRAVENOUS at 05:17

## 2018-01-01 RX ADMIN — PROMETHAZINE HYDROCHLORIDE 6.25 MG: 25 INJECTION INTRAMUSCULAR; INTRAVENOUS at 05:45

## 2018-01-01 RX ADMIN — HALOPERIDOL LACTATE 1 MG: 5 INJECTION, SOLUTION INTRAMUSCULAR at 08:34

## 2018-01-01 RX ADMIN — HALOPERIDOL LACTATE 1 MG: 5 INJECTION, SOLUTION INTRAMUSCULAR at 23:35

## 2018-01-01 RX ADMIN — SALINE NASAL SPRAY 2 SPRAY: 1.5 SOLUTION NASAL at 17:38

## 2018-01-01 RX ADMIN — HYDROMORPHONE HYDROCHLORIDE 0.25 MG: 10 INJECTION, SOLUTION INTRAMUSCULAR; INTRAVENOUS; SUBCUTANEOUS at 22:51

## 2018-01-01 RX ADMIN — ONDANSETRON HYDROCHLORIDE 4 MG: 2 INJECTION, SOLUTION INTRAMUSCULAR; INTRAVENOUS at 03:53

## 2018-01-01 RX ADMIN — IPRATROPIUM BROMIDE AND ALBUTEROL SULFATE 3 ML: .5; 3 SOLUTION RESPIRATORY (INHALATION) at 07:07

## 2018-01-01 RX ADMIN — KETOROLAC TROMETHAMINE 15 MG: 30 INJECTION, SOLUTION INTRAMUSCULAR at 01:05

## 2018-01-01 RX ADMIN — PANTOPRAZOLE SODIUM 40 MG: 40 INJECTION, POWDER, FOR SOLUTION INTRAVENOUS at 08:34

## 2018-01-01 RX ADMIN — GABAPENTIN 600 MG: 300 CAPSULE ORAL at 07:45

## 2018-01-01 RX ADMIN — ONDANSETRON HYDROCHLORIDE 4 MG: 2 INJECTION, SOLUTION INTRAMUSCULAR; INTRAVENOUS at 20:14

## 2018-01-01 RX ADMIN — GABAPENTIN 600 MG: 300 CAPSULE ORAL at 02:32

## 2018-01-01 RX ADMIN — HYDROMORPHONE HYDROCHLORIDE 0.5 MG: 2 INJECTION INTRAMUSCULAR; INTRAVENOUS; SUBCUTANEOUS at 08:52

## 2018-01-01 RX ADMIN — KETOROLAC TROMETHAMINE 15 MG: 15 INJECTION, SOLUTION INTRAMUSCULAR; INTRAVENOUS at 11:28

## 2018-01-01 RX ADMIN — GLYCOPYRROLATE 0.4 MG: 0.2 INJECTION, SOLUTION INTRAMUSCULAR; INTRAVENOUS at 05:08

## 2018-01-01 RX ADMIN — HYDROMORPHONE HYDROCHLORIDE 0.5 MG: 2 INJECTION INTRAMUSCULAR; INTRAVENOUS; SUBCUTANEOUS at 11:17

## 2018-01-01 RX ADMIN — SODIUM CHLORIDE 100 ML/HR: 9 INJECTION, SOLUTION INTRAVENOUS at 05:58

## 2018-01-01 RX ADMIN — SALINE NASAL SPRAY 2 SPRAY: 1.5 SOLUTION NASAL at 08:19

## 2018-01-01 RX ADMIN — ONDANSETRON 4 MG: 2 INJECTION INTRAMUSCULAR; INTRAVENOUS at 13:39

## 2018-01-01 RX ADMIN — LORAZEPAM 0.5 MG: 2 INJECTION INTRAMUSCULAR; INTRAVENOUS at 08:36

## 2018-01-01 RX ADMIN — GABAPENTIN 600 MG: 300 CAPSULE ORAL at 09:47

## 2018-01-01 RX ADMIN — SALINE NASAL SPRAY 2 SPRAY: 1.5 SOLUTION NASAL at 20:15

## 2018-01-01 RX ADMIN — GLYCOPYRROLATE 0.4 MG: 0.2 INJECTION, SOLUTION INTRAMUSCULAR; INTRAVENOUS at 08:07

## 2018-01-01 RX ADMIN — HALOPERIDOL LACTATE 0.5 MG: 5 INJECTION, SOLUTION INTRAMUSCULAR at 17:08

## 2018-01-01 RX ADMIN — FAMOTIDINE 20 MG: 10 INJECTION, SOLUTION INTRAVENOUS at 14:39

## 2018-01-01 RX ADMIN — ONDANSETRON HYDROCHLORIDE 4 MG: 2 INJECTION, SOLUTION INTRAMUSCULAR; INTRAVENOUS at 15:30

## 2018-01-01 RX ADMIN — KETOROLAC TROMETHAMINE 15 MG: 15 INJECTION, SOLUTION INTRAMUSCULAR; INTRAVENOUS at 14:50

## 2018-01-01 RX ADMIN — GABAPENTIN 600 MG: 300 CAPSULE ORAL at 00:24

## 2018-01-01 RX ADMIN — ONDANSETRON HYDROCHLORIDE 8 MG: 2 INJECTION, SOLUTION INTRAMUSCULAR; INTRAVENOUS at 16:28

## 2018-01-01 RX ADMIN — HYDROMORPHONE HYDROCHLORIDE 0.25 MG: 10 INJECTION, SOLUTION INTRAMUSCULAR; INTRAVENOUS; SUBCUTANEOUS at 11:58

## 2018-01-01 RX ADMIN — ONDANSETRON HYDROCHLORIDE 4 MG: 2 INJECTION, SOLUTION INTRAMUSCULAR; INTRAVENOUS at 04:56

## 2018-01-01 RX ADMIN — GABAPENTIN 600 MG: 300 CAPSULE ORAL at 13:30

## 2018-01-01 RX ADMIN — IPRATROPIUM BROMIDE AND ALBUTEROL SULFATE 3 ML: .5; 3 SOLUTION RESPIRATORY (INHALATION) at 08:57

## 2018-01-01 RX ADMIN — KETOROLAC TROMETHAMINE 15 MG: 30 INJECTION, SOLUTION INTRAMUSCULAR at 05:54

## 2018-01-01 RX ADMIN — HYDROMORPHONE HYDROCHLORIDE 0.25 MG: 2 INJECTION INTRAMUSCULAR; INTRAVENOUS; SUBCUTANEOUS at 05:00

## 2018-01-01 RX ADMIN — SALINE NASAL SPRAY 2 SPRAY: 1.5 SOLUTION NASAL at 09:00

## 2018-01-01 RX ADMIN — GABAPENTIN 600 MG: 300 CAPSULE ORAL at 21:55

## 2018-01-01 RX ADMIN — FAMOTIDINE 20 MG: 10 INJECTION, SOLUTION INTRAVENOUS at 08:15

## 2018-01-01 RX ADMIN — IPRATROPIUM BROMIDE AND ALBUTEROL SULFATE 3 ML: .5; 3 SOLUTION RESPIRATORY (INHALATION) at 13:07

## 2018-01-01 RX ADMIN — GLYCOPYRROLATE 0.4 MG: 0.2 INJECTION, SOLUTION INTRAMUSCULAR; INTRAVENOUS at 11:33

## 2018-01-01 RX ADMIN — KETOROLAC TROMETHAMINE 15 MG: 15 INJECTION, SOLUTION INTRAMUSCULAR; INTRAVENOUS at 21:58

## 2018-01-01 RX ADMIN — HYDROMORPHONE HYDROCHLORIDE 0.25 MG: 10 INJECTION, SOLUTION INTRAMUSCULAR; INTRAVENOUS; SUBCUTANEOUS at 01:06

## 2018-01-01 RX ADMIN — ONDANSETRON HYDROCHLORIDE 8 MG: 2 INJECTION, SOLUTION INTRAMUSCULAR; INTRAVENOUS at 15:54

## 2018-01-01 RX ADMIN — HYDROMORPHONE HYDROCHLORIDE 0.25 MG: 10 INJECTION, SOLUTION INTRAMUSCULAR; INTRAVENOUS; SUBCUTANEOUS at 05:54

## 2018-01-01 RX ADMIN — HALOPERIDOL LACTATE 0.5 MG: 5 INJECTION, SOLUTION INTRAMUSCULAR at 17:41

## 2018-01-01 RX ADMIN — SCOPALAMINE 1 PATCH: 1 PATCH, EXTENDED RELEASE TRANSDERMAL at 09:50

## 2018-01-01 RX ADMIN — IPRATROPIUM BROMIDE AND ALBUTEROL SULFATE 3 ML: .5; 3 SOLUTION RESPIRATORY (INHALATION) at 12:38

## 2018-01-01 RX ADMIN — KETOROLAC TROMETHAMINE 15 MG: 15 INJECTION, SOLUTION INTRAMUSCULAR; INTRAVENOUS at 17:19

## 2018-01-01 RX ADMIN — ONDANSETRON HYDROCHLORIDE 8 MG: 2 INJECTION, SOLUTION INTRAMUSCULAR; INTRAVENOUS at 05:06

## 2018-01-01 RX ADMIN — KETOROLAC TROMETHAMINE 15 MG: 15 INJECTION, SOLUTION INTRAMUSCULAR; INTRAVENOUS at 13:59

## 2018-01-01 RX ADMIN — SALINE NASAL SPRAY 2 SPRAY: 1.5 SOLUTION NASAL at 16:56

## 2018-01-01 RX ADMIN — ONDANSETRON HYDROCHLORIDE 8 MG: 2 INJECTION, SOLUTION INTRAMUSCULAR; INTRAVENOUS at 17:37

## 2018-01-01 RX ADMIN — SALINE NASAL SPRAY 2 SPRAY: 1.5 SOLUTION NASAL at 17:12

## 2018-01-01 RX ADMIN — KETOROLAC TROMETHAMINE 15 MG: 15 INJECTION, SOLUTION INTRAMUSCULAR; INTRAVENOUS at 01:14

## 2018-01-01 RX ADMIN — PANTOPRAZOLE SODIUM 40 MG: 40 INJECTION, POWDER, FOR SOLUTION INTRAVENOUS at 21:25

## 2018-01-01 RX ADMIN — GABAPENTIN 600 MG: 300 CAPSULE ORAL at 09:11

## 2018-01-01 RX ADMIN — KETOROLAC TROMETHAMINE 15 MG: 15 INJECTION, SOLUTION INTRAMUSCULAR; INTRAVENOUS at 06:02

## 2018-01-01 RX ADMIN — KETOROLAC TROMETHAMINE 15 MG: 15 INJECTION, SOLUTION INTRAMUSCULAR; INTRAVENOUS at 05:01

## 2018-01-01 RX ADMIN — KETOROLAC TROMETHAMINE 15 MG: 30 INJECTION, SOLUTION INTRAMUSCULAR at 17:24

## 2018-01-01 RX ADMIN — ONDANSETRON HYDROCHLORIDE 8 MG: 2 INJECTION, SOLUTION INTRAMUSCULAR; INTRAVENOUS at 10:34

## 2018-01-01 RX ADMIN — HALOPERIDOL LACTATE 0.5 MG: 5 INJECTION, SOLUTION INTRAMUSCULAR at 11:34

## 2018-01-01 RX ADMIN — SALINE NASAL SPRAY 2 SPRAY: 1.5 SOLUTION NASAL at 08:12

## 2018-01-01 RX ADMIN — SALINE NASAL SPRAY 2 SPRAY: 1.5 SOLUTION NASAL at 09:22

## 2018-01-01 RX ADMIN — GABAPENTIN 600 MG: 300 CAPSULE ORAL at 21:56

## 2018-01-01 RX ADMIN — HALOPERIDOL LACTATE 1 MG: 5 INJECTION, SOLUTION INTRAMUSCULAR at 16:17

## 2018-01-01 RX ADMIN — HALOPERIDOL LACTATE 1 MG: 5 INJECTION, SOLUTION INTRAMUSCULAR at 01:06

## 2018-01-01 RX ADMIN — CEFTRIAXONE SODIUM 1 G: 1 INJECTION, SOLUTION INTRAVENOUS at 15:30

## 2018-01-01 RX ADMIN — HALOPERIDOL LACTATE 1 MG: 5 INJECTION, SOLUTION INTRAMUSCULAR at 00:04

## 2018-01-01 RX ADMIN — KETOROLAC TROMETHAMINE 15 MG: 15 INJECTION, SOLUTION INTRAMUSCULAR; INTRAVENOUS at 12:15

## 2018-01-01 RX ADMIN — HYDROMORPHONE HYDROCHLORIDE 0.25 MG: 10 INJECTION, SOLUTION INTRAMUSCULAR; INTRAVENOUS; SUBCUTANEOUS at 16:56

## 2018-01-01 RX ADMIN — IPRATROPIUM BROMIDE AND ALBUTEROL SULFATE 3 ML: .5; 3 SOLUTION RESPIRATORY (INHALATION) at 12:53

## 2018-01-01 RX ADMIN — HALOPERIDOL LACTATE 0.5 MG: 5 INJECTION, SOLUTION INTRAMUSCULAR at 09:46

## 2018-01-01 RX ADMIN — ONDANSETRON HYDROCHLORIDE 4 MG: 2 INJECTION, SOLUTION INTRAMUSCULAR; INTRAVENOUS at 21:20

## 2018-01-01 RX ADMIN — IPRATROPIUM BROMIDE AND ALBUTEROL SULFATE 3 ML: .5; 3 SOLUTION RESPIRATORY (INHALATION) at 16:06

## 2018-01-01 RX ADMIN — GLYCOPYRROLATE 0.4 MG: 0.2 INJECTION, SOLUTION INTRAMUSCULAR; INTRAVENOUS at 15:54

## 2018-01-01 RX ADMIN — HYDROMORPHONE HYDROCHLORIDE 0.5 MG: 10 INJECTION, SOLUTION INTRAMUSCULAR; INTRAVENOUS; SUBCUTANEOUS at 18:20

## 2018-01-01 RX ADMIN — FAMOTIDINE 20 MG: 10 INJECTION, SOLUTION INTRAVENOUS at 08:26

## 2018-01-01 RX ADMIN — SODIUM CHLORIDE 1000 ML: 9 INJECTION, SOLUTION INTRAVENOUS at 13:37

## 2018-01-01 RX ADMIN — OXYCODONE AND ACETAMINOPHEN 1 TABLET: 5; 325 TABLET ORAL at 17:39

## 2018-01-01 RX ADMIN — HALOPERIDOL LACTATE 1 MG: 5 INJECTION, SOLUTION INTRAMUSCULAR at 15:54

## 2018-01-01 RX ADMIN — ONDANSETRON HYDROCHLORIDE 4 MG: 2 INJECTION, SOLUTION INTRAMUSCULAR; INTRAVENOUS at 12:37

## 2018-01-01 RX ADMIN — GABAPENTIN 600 MG: 300 CAPSULE ORAL at 11:52

## 2018-01-01 RX ADMIN — HYDROMORPHONE HYDROCHLORIDE 0.25 MG: 10 INJECTION, SOLUTION INTRAMUSCULAR; INTRAVENOUS; SUBCUTANEOUS at 11:59

## 2018-01-01 RX ADMIN — FAMOTIDINE 20 MG: 20 TABLET, FILM COATED ORAL at 08:54

## 2018-01-01 RX ADMIN — HALOPERIDOL LACTATE 0.5 MG: 5 INJECTION, SOLUTION INTRAMUSCULAR at 17:00

## 2018-01-01 RX ADMIN — KETOROLAC TROMETHAMINE 15 MG: 15 INJECTION, SOLUTION INTRAMUSCULAR; INTRAVENOUS at 09:53

## 2018-01-01 RX ADMIN — SALINE NASAL SPRAY 2 SPRAY: 1.5 SOLUTION NASAL at 20:29

## 2018-01-01 RX ADMIN — GLYCOPYRROLATE 0.4 MG: 0.2 INJECTION, SOLUTION INTRAMUSCULAR; INTRAVENOUS at 02:59

## 2018-01-01 RX ADMIN — IPRATROPIUM BROMIDE AND ALBUTEROL SULFATE 3 ML: .5; 3 SOLUTION RESPIRATORY (INHALATION) at 08:21

## 2018-01-01 RX ADMIN — FAMOTIDINE 20 MG: 10 INJECTION, SOLUTION INTRAVENOUS at 21:11

## 2018-01-01 RX ADMIN — GABAPENTIN 600 MG: 300 CAPSULE ORAL at 23:56

## 2018-01-01 RX ADMIN — GABAPENTIN 600 MG: 300 CAPSULE ORAL at 08:08

## 2018-01-01 RX ADMIN — HYDROMORPHONE HYDROCHLORIDE 0.25 MG: 10 INJECTION, SOLUTION INTRAMUSCULAR; INTRAVENOUS; SUBCUTANEOUS at 17:24

## 2018-01-01 RX ADMIN — ONDANSETRON HYDROCHLORIDE 4 MG: 2 INJECTION, SOLUTION INTRAMUSCULAR; INTRAVENOUS at 04:07

## 2018-01-01 RX ADMIN — PANTOPRAZOLE SODIUM 40 MG: 40 INJECTION, POWDER, FOR SOLUTION INTRAVENOUS at 20:23

## 2018-01-01 RX ADMIN — IPRATROPIUM BROMIDE AND ALBUTEROL SULFATE 3 ML: .5; 3 SOLUTION RESPIRATORY (INHALATION) at 07:54

## 2018-01-01 RX ADMIN — IPRATROPIUM BROMIDE AND ALBUTEROL SULFATE 3 ML: .5; 3 SOLUTION RESPIRATORY (INHALATION) at 12:43

## 2018-01-01 RX ADMIN — GLYCOPYRROLATE 0.4 MG: 0.2 INJECTION, SOLUTION INTRAMUSCULAR; INTRAVENOUS at 01:05

## 2018-01-01 RX ADMIN — ONDANSETRON HYDROCHLORIDE 4 MG: 2 INJECTION, SOLUTION INTRAMUSCULAR; INTRAVENOUS at 10:05

## 2018-01-01 RX ADMIN — HYDROMORPHONE HYDROCHLORIDE 0.25 MG: 2 INJECTION INTRAMUSCULAR; INTRAVENOUS; SUBCUTANEOUS at 21:56

## 2018-01-01 RX ADMIN — GABAPENTIN 600 MG: 300 CAPSULE ORAL at 20:05

## 2018-01-01 RX ADMIN — ONDANSETRON HYDROCHLORIDE 4 MG: 2 INJECTION, SOLUTION INTRAMUSCULAR; INTRAVENOUS at 09:17

## 2018-01-01 RX ADMIN — IPRATROPIUM BROMIDE AND ALBUTEROL SULFATE 3 ML: .5; 3 SOLUTION RESPIRATORY (INHALATION) at 12:29

## 2018-01-01 RX ADMIN — SALINE NASAL SPRAY 2 SPRAY: 1.5 SOLUTION NASAL at 17:40

## 2018-01-01 RX ADMIN — ONDANSETRON HYDROCHLORIDE 4 MG: 2 INJECTION, SOLUTION INTRAMUSCULAR; INTRAVENOUS at 08:18

## 2018-01-01 RX ADMIN — SALINE NASAL SPRAY 2 SPRAY: 1.5 SOLUTION NASAL at 20:10

## 2018-01-01 RX ADMIN — GLYCOPYRROLATE 0.4 MG: 0.2 INJECTION, SOLUTION INTRAMUSCULAR; INTRAVENOUS at 13:53

## 2018-01-01 RX ADMIN — SALINE NASAL SPRAY 2 SPRAY: 1.5 SOLUTION NASAL at 22:04

## 2018-01-01 RX ADMIN — HYDROMORPHONE HYDROCHLORIDE 0.25 MG: 2 INJECTION INTRAMUSCULAR; INTRAVENOUS; SUBCUTANEOUS at 17:41

## 2018-01-01 RX ADMIN — GABAPENTIN 600 MG: 300 CAPSULE ORAL at 08:18

## 2018-01-01 RX ADMIN — PROMETHAZINE HYDROCHLORIDE 6.25 MG: 25 INJECTION INTRAMUSCULAR; INTRAVENOUS at 00:49

## 2018-01-01 RX ADMIN — ONDANSETRON HYDROCHLORIDE 8 MG: 2 INJECTION, SOLUTION INTRAMUSCULAR; INTRAVENOUS at 10:28

## 2018-01-01 RX ADMIN — HALOPERIDOL LACTATE 1 MG: 5 INJECTION, SOLUTION INTRAMUSCULAR at 16:01

## 2018-01-01 RX ADMIN — GABAPENTIN 600 MG: 300 CAPSULE ORAL at 23:50

## 2018-01-01 RX ADMIN — GABAPENTIN 600 MG: 300 CAPSULE ORAL at 08:54

## 2018-01-01 RX ADMIN — KETOROLAC TROMETHAMINE 15 MG: 15 INJECTION, SOLUTION INTRAMUSCULAR; INTRAVENOUS at 06:47

## 2018-01-01 RX ADMIN — KETOROLAC TROMETHAMINE 15 MG: 15 INJECTION, SOLUTION INTRAMUSCULAR; INTRAVENOUS at 05:33

## 2018-01-01 RX ADMIN — KETOROLAC TROMETHAMINE 15 MG: 15 INJECTION, SOLUTION INTRAMUSCULAR; INTRAVENOUS at 12:38

## 2018-01-01 RX ADMIN — ONDANSETRON HYDROCHLORIDE 8 MG: 2 INJECTION, SOLUTION INTRAMUSCULAR; INTRAVENOUS at 09:41

## 2018-01-01 RX ADMIN — ONDANSETRON HYDROCHLORIDE 8 MG: 2 INJECTION, SOLUTION INTRAMUSCULAR; INTRAVENOUS at 22:51

## 2018-01-01 RX ADMIN — ONDANSETRON HYDROCHLORIDE 8 MG: 2 INJECTION, SOLUTION INTRAMUSCULAR; INTRAVENOUS at 04:09

## 2018-01-01 RX ADMIN — KETOROLAC TROMETHAMINE 15 MG: 15 INJECTION, SOLUTION INTRAMUSCULAR; INTRAVENOUS at 23:34

## 2018-01-01 RX ADMIN — GLYCOPYRROLATE 0.4 MG: 0.2 INJECTION, SOLUTION INTRAMUSCULAR; INTRAVENOUS at 23:35

## 2018-01-01 RX ADMIN — GLYCOPYRROLATE 0.4 MG: 0.2 INJECTION, SOLUTION INTRAMUSCULAR; INTRAVENOUS at 12:37

## 2018-01-01 RX ADMIN — HYDROMORPHONE HYDROCHLORIDE 0.25 MG: 2 INJECTION INTRAMUSCULAR; INTRAVENOUS; SUBCUTANEOUS at 12:39

## 2018-01-01 RX ADMIN — IPRATROPIUM BROMIDE AND ALBUTEROL SULFATE 3 ML: .5; 3 SOLUTION RESPIRATORY (INHALATION) at 20:29

## 2018-01-01 RX ADMIN — MORPHINE SULFATE 4 MG: 100 SOLUTION ORAL at 11:48

## 2018-01-01 RX ADMIN — HYDROMORPHONE HYDROCHLORIDE 0.25 MG: 10 INJECTION, SOLUTION INTRAMUSCULAR; INTRAVENOUS; SUBCUTANEOUS at 15:16

## 2018-01-01 RX ADMIN — HYDROMORPHONE HYDROCHLORIDE 0.25 MG: 10 INJECTION, SOLUTION INTRAMUSCULAR; INTRAVENOUS; SUBCUTANEOUS at 23:42

## 2018-01-01 RX ADMIN — HYDROGEN PEROXIDE: 3 LIQUID TOPICAL at 12:06

## 2018-01-01 RX ADMIN — ONDANSETRON HYDROCHLORIDE 4 MG: 2 INJECTION, SOLUTION INTRAMUSCULAR; INTRAVENOUS at 17:40

## 2018-01-01 RX ADMIN — GLYCOPYRROLATE 0.1 MG: 0.2 INJECTION, SOLUTION INTRAMUSCULAR; INTRAVENOUS at 16:27

## 2018-01-01 RX ADMIN — LORAZEPAM 0.5 MG: 0.5 TABLET ORAL at 08:54

## 2018-01-01 RX ADMIN — HYDROGEN PEROXIDE: 3 LIQUID TOPICAL at 21:25

## 2018-01-01 RX ADMIN — ONDANSETRON HYDROCHLORIDE 4 MG: 2 INJECTION, SOLUTION INTRAMUSCULAR; INTRAVENOUS at 09:46

## 2018-01-01 RX ADMIN — GABAPENTIN 600 MG: 300 CAPSULE ORAL at 12:37

## 2018-01-01 RX ADMIN — HYDROMORPHONE HYDROCHLORIDE 0.25 MG: 10 INJECTION, SOLUTION INTRAMUSCULAR; INTRAVENOUS; SUBCUTANEOUS at 16:16

## 2018-01-01 RX ADMIN — FAMOTIDINE 20 MG: 10 INJECTION, SOLUTION INTRAVENOUS at 09:11

## 2018-01-01 RX ADMIN — GLYCOPYRROLATE 0.4 MG: 0.2 INJECTION, SOLUTION INTRAMUSCULAR; INTRAVENOUS at 09:36

## 2018-01-01 RX ADMIN — HYDROGEN PEROXIDE 3 ML: 3 LIQUID TOPICAL at 08:12

## 2018-01-01 RX ADMIN — KETOROLAC TROMETHAMINE 15 MG: 15 INJECTION, SOLUTION INTRAMUSCULAR; INTRAVENOUS at 21:11

## 2018-01-01 RX ADMIN — HALOPERIDOL LACTATE 1 MG: 5 INJECTION, SOLUTION INTRAMUSCULAR at 11:38

## 2018-01-01 RX ADMIN — GLYCOPYRROLATE 0.4 MG: 0.2 INJECTION, SOLUTION INTRAMUSCULAR; INTRAVENOUS at 20:48

## 2018-01-01 RX ADMIN — GLYCOPYRROLATE 0.4 MG: 0.2 INJECTION, SOLUTION INTRAMUSCULAR; INTRAVENOUS at 08:34

## 2018-01-01 RX ADMIN — ONDANSETRON HYDROCHLORIDE 8 MG: 2 INJECTION, SOLUTION INTRAMUSCULAR; INTRAVENOUS at 09:45

## 2018-01-01 RX ADMIN — GABAPENTIN 600 MG: 300 CAPSULE ORAL at 11:59

## 2018-01-01 RX ADMIN — KETOROLAC TROMETHAMINE 15 MG: 15 INJECTION, SOLUTION INTRAMUSCULAR; INTRAVENOUS at 06:35

## 2018-01-01 RX ADMIN — KETOROLAC TROMETHAMINE 15 MG: 15 INJECTION, SOLUTION INTRAMUSCULAR; INTRAVENOUS at 00:06

## 2018-01-01 RX ADMIN — GABAPENTIN 600 MG: 300 CAPSULE ORAL at 19:43

## 2018-01-01 RX ADMIN — IPRATROPIUM BROMIDE AND ALBUTEROL SULFATE 3 ML: .5; 3 SOLUTION RESPIRATORY (INHALATION) at 12:10

## 2018-01-01 RX ADMIN — KETOROLAC TROMETHAMINE 15 MG: 15 INJECTION, SOLUTION INTRAMUSCULAR; INTRAVENOUS at 11:47

## 2018-01-01 RX ADMIN — ONDANSETRON HYDROCHLORIDE 4 MG: 2 INJECTION, SOLUTION INTRAMUSCULAR; INTRAVENOUS at 14:50

## 2018-01-01 RX ADMIN — IPRATROPIUM BROMIDE AND ALBUTEROL SULFATE 3 ML: .5; 3 SOLUTION RESPIRATORY (INHALATION) at 19:09

## 2018-01-01 RX ADMIN — HALOPERIDOL LACTATE 1 MG: 5 INJECTION, SOLUTION INTRAMUSCULAR at 10:34

## 2018-01-01 RX ADMIN — SALINE NASAL SPRAY 2 SPRAY: 1.5 SOLUTION NASAL at 15:34

## 2018-01-01 RX ADMIN — KETOROLAC TROMETHAMINE 15 MG: 15 INJECTION, SOLUTION INTRAMUSCULAR; INTRAVENOUS at 18:33

## 2018-01-01 RX ADMIN — HALOPERIDOL LACTATE 1 MG: 5 INJECTION, SOLUTION INTRAMUSCULAR at 15:14

## 2018-01-01 RX ADMIN — ONDANSETRON HYDROCHLORIDE 4 MG: 2 INJECTION, SOLUTION INTRAMUSCULAR; INTRAVENOUS at 09:44

## 2018-01-01 RX ADMIN — ONDANSETRON HYDROCHLORIDE 8 MG: 2 INJECTION, SOLUTION INTRAMUSCULAR; INTRAVENOUS at 10:11

## 2018-01-01 RX ADMIN — HALOPERIDOL LACTATE 0.5 MG: 5 INJECTION, SOLUTION INTRAMUSCULAR at 07:54

## 2018-01-01 RX ADMIN — IPRATROPIUM BROMIDE AND ALBUTEROL SULFATE 3 ML: .5; 3 SOLUTION RESPIRATORY (INHALATION) at 08:42

## 2018-01-01 RX ADMIN — HALOPERIDOL LACTATE 0.5 MG: 5 INJECTION, SOLUTION INTRAMUSCULAR at 08:18

## 2018-01-01 RX ADMIN — ONDANSETRON HYDROCHLORIDE 4 MG: 2 INJECTION, SOLUTION INTRAMUSCULAR; INTRAVENOUS at 03:13

## 2018-01-01 RX ADMIN — IPRATROPIUM BROMIDE AND ALBUTEROL SULFATE 3 ML: .5; 3 SOLUTION RESPIRATORY (INHALATION) at 07:49

## 2018-01-01 RX ADMIN — HYDROMORPHONE HYDROCHLORIDE 0.25 MG: 10 INJECTION, SOLUTION INTRAMUSCULAR; INTRAVENOUS; SUBCUTANEOUS at 11:34

## 2018-01-01 RX ADMIN — KETOROLAC TROMETHAMINE 15 MG: 15 INJECTION, SOLUTION INTRAMUSCULAR; INTRAVENOUS at 20:17

## 2018-01-01 RX ADMIN — HALOPERIDOL LACTATE 1 MG: 5 INJECTION, SOLUTION INTRAMUSCULAR at 05:07

## 2018-01-01 RX ADMIN — HYDROGEN PEROXIDE: 3 LIQUID TOPICAL at 15:57

## 2018-01-01 RX ADMIN — HALOPERIDOL LACTATE 0.5 MG: 5 INJECTION, SOLUTION INTRAMUSCULAR at 11:28

## 2018-01-01 RX ADMIN — KETOROLAC TROMETHAMINE 15 MG: 15 INJECTION, SOLUTION INTRAMUSCULAR; INTRAVENOUS at 19:50

## 2018-01-01 RX ADMIN — IPRATROPIUM BROMIDE AND ALBUTEROL SULFATE 3 ML: .5; 3 SOLUTION RESPIRATORY (INHALATION) at 09:44

## 2018-01-01 RX ADMIN — HYDROMORPHONE HYDROCHLORIDE 0.25 MG: 10 INJECTION, SOLUTION INTRAMUSCULAR; INTRAVENOUS; SUBCUTANEOUS at 06:47

## 2018-01-01 RX ADMIN — IPRATROPIUM BROMIDE AND ALBUTEROL SULFATE 3 ML: .5; 3 SOLUTION RESPIRATORY (INHALATION) at 07:18

## 2018-01-01 RX ADMIN — ONDANSETRON HYDROCHLORIDE 8 MG: 2 INJECTION, SOLUTION INTRAMUSCULAR; INTRAVENOUS at 04:17

## 2018-01-01 RX ADMIN — KETOROLAC TROMETHAMINE 15 MG: 15 INJECTION, SOLUTION INTRAMUSCULAR; INTRAVENOUS at 06:09

## 2018-01-01 RX ADMIN — ONDANSETRON HYDROCHLORIDE 8 MG: 2 INJECTION, SOLUTION INTRAMUSCULAR; INTRAVENOUS at 15:57

## 2018-01-01 RX ADMIN — RIVAROXABAN 15 MG: 15 TABLET, FILM COATED ORAL at 19:45

## 2018-01-01 RX ADMIN — HYDROMORPHONE HYDROCHLORIDE 0.25 MG: 2 INJECTION INTRAMUSCULAR; INTRAVENOUS; SUBCUTANEOUS at 09:21

## 2018-01-01 RX ADMIN — GABAPENTIN 600 MG: 300 CAPSULE ORAL at 20:14

## 2018-01-01 RX ADMIN — LORAZEPAM 0.25 MG: 2 INJECTION INTRAMUSCULAR; INTRAVENOUS at 11:59

## 2018-01-01 RX ADMIN — HALOPERIDOL LACTATE 0.5 MG: 5 INJECTION, SOLUTION INTRAMUSCULAR at 12:38

## 2018-01-01 RX ADMIN — LORAZEPAM 0.5 MG: 0.5 TABLET ORAL at 00:49

## 2018-01-01 RX ADMIN — GABAPENTIN 600 MG: 300 CAPSULE ORAL at 19:45

## 2018-01-01 RX ADMIN — SALINE NASAL SPRAY 2 SPRAY: 1.5 SOLUTION NASAL at 08:57

## 2018-01-01 RX ADMIN — FAMOTIDINE 20 MG: 10 INJECTION, SOLUTION INTRAVENOUS at 21:20

## 2018-01-01 RX ADMIN — KETOROLAC TROMETHAMINE 15 MG: 15 INJECTION, SOLUTION INTRAMUSCULAR; INTRAVENOUS at 00:24

## 2018-01-01 RX ADMIN — KETOROLAC TROMETHAMINE 15 MG: 15 INJECTION, SOLUTION INTRAMUSCULAR; INTRAVENOUS at 17:08

## 2018-01-01 RX ADMIN — KETOROLAC TROMETHAMINE 15 MG: 15 INJECTION, SOLUTION INTRAMUSCULAR; INTRAVENOUS at 15:30

## 2018-01-01 RX ADMIN — HYDROMORPHONE HYDROCHLORIDE 0.25 MG: 2 INJECTION INTRAMUSCULAR; INTRAVENOUS; SUBCUTANEOUS at 05:13

## 2018-01-01 RX ADMIN — HALOPERIDOL LACTATE 1 MG: 5 INJECTION, SOLUTION INTRAMUSCULAR at 11:47

## 2018-01-01 RX ADMIN — SALINE NASAL SPRAY 2 SPRAY: 1.5 SOLUTION NASAL at 08:29

## 2018-01-01 RX ADMIN — SALINE NASAL SPRAY 2 SPRAY: 1.5 SOLUTION NASAL at 07:58

## 2018-01-01 RX ADMIN — HALOPERIDOL LACTATE 1 MG: 5 INJECTION, SOLUTION INTRAMUSCULAR at 11:57

## 2018-01-01 RX ADMIN — LORAZEPAM 0.5 MG: 2 INJECTION INTRAMUSCULAR; INTRAVENOUS at 18:21

## 2018-01-01 RX ADMIN — KETOROLAC TROMETHAMINE 15 MG: 15 INJECTION, SOLUTION INTRAMUSCULAR; INTRAVENOUS at 00:04

## 2018-01-01 RX ADMIN — SODIUM CHLORIDE 125 ML/HR: 9 INJECTION, SOLUTION INTRAVENOUS at 14:55

## 2018-01-01 RX ADMIN — SALINE NASAL SPRAY 2 SPRAY: 1.5 SOLUTION NASAL at 08:34

## 2018-01-01 RX ADMIN — GABAPENTIN 600 MG: 300 CAPSULE ORAL at 23:58

## 2018-01-01 RX ADMIN — ONDANSETRON HYDROCHLORIDE 4 MG: 2 INJECTION, SOLUTION INTRAMUSCULAR; INTRAVENOUS at 21:11

## 2018-01-01 RX ADMIN — GABAPENTIN 600 MG: 300 CAPSULE ORAL at 11:28

## 2018-01-01 RX ADMIN — RIVAROXABAN 15 MG: 15 TABLET, FILM COATED ORAL at 21:56

## 2018-01-01 RX ADMIN — SALINE NASAL SPRAY 2 SPRAY: 1.5 SOLUTION NASAL at 09:12

## 2018-01-01 RX ADMIN — ONDANSETRON HYDROCHLORIDE 4 MG: 2 INJECTION, SOLUTION INTRAMUSCULAR; INTRAVENOUS at 20:17

## 2018-01-01 RX ADMIN — RIVAROXABAN 15 MG: 15 TABLET, FILM COATED ORAL at 20:14

## 2018-01-01 RX ADMIN — KETOROLAC TROMETHAMINE 15 MG: 15 INJECTION, SOLUTION INTRAMUSCULAR; INTRAVENOUS at 11:59

## 2018-01-01 RX ADMIN — HYDROGEN PEROXIDE: 3 LIQUID TOPICAL at 22:08

## 2018-01-01 RX ADMIN — LORAZEPAM 0.5 MG: 2 INJECTION INTRAMUSCULAR; INTRAVENOUS at 00:08

## 2018-01-01 RX ADMIN — ONDANSETRON HYDROCHLORIDE 8 MG: 2 INJECTION, SOLUTION INTRAMUSCULAR; INTRAVENOUS at 21:25

## 2018-01-01 RX ADMIN — HYDROGEN PEROXIDE: 3 LIQUID TOPICAL at 08:34

## 2018-01-01 RX ADMIN — LORAZEPAM 0.5 MG: 2 INJECTION INTRAMUSCULAR; INTRAVENOUS at 21:08

## 2018-01-01 RX ADMIN — PANTOPRAZOLE SODIUM 40 MG: 40 INJECTION, POWDER, FOR SOLUTION INTRAVENOUS at 09:00

## 2018-01-01 RX ADMIN — HALOPERIDOL LACTATE 0.5 MG: 5 INJECTION, SOLUTION INTRAMUSCULAR at 11:52

## 2018-01-01 RX ADMIN — HALOPERIDOL LACTATE 0.5 MG: 5 INJECTION, SOLUTION INTRAMUSCULAR at 08:07

## 2018-01-01 RX ADMIN — ONDANSETRON HYDROCHLORIDE 8 MG: 2 INJECTION, SOLUTION INTRAMUSCULAR; INTRAVENOUS at 17:08

## 2018-01-01 RX ADMIN — GABAPENTIN 600 MG: 300 CAPSULE ORAL at 07:53

## 2018-01-01 RX ADMIN — GABAPENTIN 600 MG: 300 CAPSULE ORAL at 12:39

## 2018-01-01 RX ADMIN — IPRATROPIUM BROMIDE AND ALBUTEROL SULFATE 3 ML: .5; 3 SOLUTION RESPIRATORY (INHALATION) at 19:54

## 2018-01-01 RX ADMIN — HYDROGEN PEROXIDE 2 ML: 3 LIQUID TOPICAL at 16:56

## 2018-01-01 RX ADMIN — ONDANSETRON HYDROCHLORIDE 8 MG: 2 INJECTION, SOLUTION INTRAMUSCULAR; INTRAVENOUS at 20:47

## 2018-01-01 RX ADMIN — PROMETHAZINE HYDROCHLORIDE 6.25 MG: 25 INJECTION INTRAMUSCULAR; INTRAVENOUS at 13:50

## 2018-01-01 RX ADMIN — ONDANSETRON HYDROCHLORIDE 4 MG: 2 INJECTION, SOLUTION INTRAMUSCULAR; INTRAVENOUS at 15:58

## 2018-01-01 RX ADMIN — IPRATROPIUM BROMIDE AND ALBUTEROL SULFATE 3 ML: .5; 3 SOLUTION RESPIRATORY (INHALATION) at 20:51

## 2018-01-01 RX ADMIN — GABAPENTIN 600 MG: 300 CAPSULE ORAL at 22:14

## 2018-01-01 RX ADMIN — GLYCOPYRROLATE 0.4 MG: 0.2 INJECTION, SOLUTION INTRAMUSCULAR; INTRAVENOUS at 21:25

## 2018-01-01 RX ADMIN — SALINE NASAL SPRAY 2 SPRAY: 1.5 SOLUTION NASAL at 21:25

## 2018-01-01 RX ADMIN — GLYCOPYRROLATE 0.4 MG: 0.2 INJECTION, SOLUTION INTRAMUSCULAR; INTRAVENOUS at 08:36

## 2018-01-01 RX ADMIN — HALOPERIDOL LACTATE 0.5 MG: 5 INJECTION, SOLUTION INTRAMUSCULAR at 09:11

## 2018-01-01 RX ADMIN — GLYCOPYRROLATE 0.4 MG: 0.2 INJECTION, SOLUTION INTRAMUSCULAR; INTRAVENOUS at 16:56

## 2018-01-01 RX ADMIN — FUROSEMIDE 20 MG: 10 INJECTION, SOLUTION INTRAMUSCULAR; INTRAVENOUS at 11:48

## 2018-01-01 RX ADMIN — IPRATROPIUM BROMIDE AND ALBUTEROL SULFATE 3 ML: .5; 3 SOLUTION RESPIRATORY (INHALATION) at 19:49

## 2018-01-01 RX ADMIN — RIVAROXABAN 15 MG: 15 TABLET, FILM COATED ORAL at 19:43

## 2018-01-01 RX ADMIN — HALOPERIDOL LACTATE 0.5 MG: 5 INJECTION, SOLUTION INTRAMUSCULAR at 17:38

## 2018-01-01 RX ADMIN — ONDANSETRON HYDROCHLORIDE 4 MG: 2 INJECTION, SOLUTION INTRAMUSCULAR; INTRAVENOUS at 10:43

## 2018-01-01 RX ADMIN — PANTOPRAZOLE SODIUM 40 MG: 40 INJECTION, POWDER, FOR SOLUTION INTRAVENOUS at 08:36

## 2018-01-01 RX ADMIN — GABAPENTIN 600 MG: 300 CAPSULE ORAL at 08:26

## 2018-01-01 RX ADMIN — IPRATROPIUM BROMIDE AND ALBUTEROL SULFATE 3 ML: .5; 3 SOLUTION RESPIRATORY (INHALATION) at 12:05

## 2018-01-01 RX ADMIN — IPRATROPIUM BROMIDE AND ALBUTEROL SULFATE 3 ML: .5; 3 SOLUTION RESPIRATORY (INHALATION) at 11:38

## 2018-01-01 RX ADMIN — GLYCOPYRROLATE 0.4 MG: 0.2 INJECTION, SOLUTION INTRAMUSCULAR; INTRAVENOUS at 16:01

## 2018-01-01 RX ADMIN — HALOPERIDOL LACTATE 0.5 MG: 5 INJECTION, SOLUTION INTRAMUSCULAR at 16:58

## 2018-01-01 RX ADMIN — KETOROLAC TROMETHAMINE 15 MG: 15 INJECTION, SOLUTION INTRAMUSCULAR; INTRAVENOUS at 03:13

## 2018-01-01 RX ADMIN — OXYCODONE AND ACETAMINOPHEN 1 TABLET: 5; 325 TABLET ORAL at 08:07

## 2018-01-01 RX ADMIN — FUROSEMIDE 20 MG: 10 INJECTION, SOLUTION INTRAMUSCULAR; INTRAVENOUS at 20:49

## 2018-01-01 RX ADMIN — HYDROMORPHONE HYDROCHLORIDE 0.25 MG: 2 INJECTION INTRAMUSCULAR; INTRAVENOUS; SUBCUTANEOUS at 20:48

## 2018-01-01 RX ADMIN — ONDANSETRON HYDROCHLORIDE 4 MG: 2 INJECTION, SOLUTION INTRAMUSCULAR; INTRAVENOUS at 06:09

## 2018-01-01 RX ADMIN — KETOROLAC TROMETHAMINE 15 MG: 30 INJECTION, SOLUTION INTRAMUSCULAR at 18:27

## 2018-01-01 RX ADMIN — SALINE NASAL SPRAY 2 SPRAY: 1.5 SOLUTION NASAL at 21:58

## 2018-01-01 RX ADMIN — ONDANSETRON HYDROCHLORIDE 8 MG: 2 INJECTION, SOLUTION INTRAMUSCULAR; INTRAVENOUS at 22:27

## 2018-01-01 RX ADMIN — ONDANSETRON HYDROCHLORIDE 8 MG: 2 INJECTION, SOLUTION INTRAMUSCULAR; INTRAVENOUS at 16:59

## 2018-01-01 RX ADMIN — HALOPERIDOL LACTATE 0.5 MG: 5 INJECTION, SOLUTION INTRAMUSCULAR at 07:30

## 2018-01-01 RX ADMIN — IPRATROPIUM BROMIDE AND ALBUTEROL SULFATE 3 ML: .5; 3 SOLUTION RESPIRATORY (INHALATION) at 16:48

## 2018-01-01 RX ADMIN — HYDROMORPHONE HYDROCHLORIDE 0.25 MG: 2 INJECTION INTRAMUSCULAR; INTRAVENOUS; SUBCUTANEOUS at 23:55

## 2018-01-01 RX ADMIN — GLYCOPYRROLATE 0.4 MG: 0.2 INJECTION, SOLUTION INTRAMUSCULAR; INTRAVENOUS at 17:24

## 2018-01-01 RX ADMIN — HYDROMORPHONE HYDROCHLORIDE 0.25 MG: 2 INJECTION INTRAMUSCULAR; INTRAVENOUS; SUBCUTANEOUS at 17:00

## 2018-01-01 RX ADMIN — ONDANSETRON HYDROCHLORIDE 4 MG: 2 INJECTION, SOLUTION INTRAMUSCULAR; INTRAVENOUS at 16:35

## 2018-01-01 RX ADMIN — ONDANSETRON HYDROCHLORIDE 8 MG: 2 INJECTION, SOLUTION INTRAMUSCULAR; INTRAVENOUS at 22:08

## 2018-01-01 RX ADMIN — HALOPERIDOL LACTATE 1 MG: 5 INJECTION, SOLUTION INTRAMUSCULAR at 20:23

## 2018-01-01 RX ADMIN — ONDANSETRON HYDROCHLORIDE 4 MG: 2 INJECTION, SOLUTION INTRAMUSCULAR; INTRAVENOUS at 15:37

## 2018-01-01 RX ADMIN — GABAPENTIN 600 MG: 300 CAPSULE ORAL at 01:13

## 2018-01-01 RX ADMIN — ONDANSETRON HYDROCHLORIDE 8 MG: 2 INJECTION, SOLUTION INTRAMUSCULAR; INTRAVENOUS at 11:38

## 2018-01-01 RX ADMIN — GLYCOPYRROLATE 0.4 MG: 0.2 INJECTION, SOLUTION INTRAMUSCULAR; INTRAVENOUS at 20:24

## 2018-01-01 RX ADMIN — IPRATROPIUM BROMIDE AND ALBUTEROL SULFATE 3 ML: .5; 3 SOLUTION RESPIRATORY (INHALATION) at 19:11

## 2018-01-01 RX ADMIN — IPRATROPIUM BROMIDE AND ALBUTEROL SULFATE 3 ML: .5; 3 SOLUTION RESPIRATORY (INHALATION) at 11:45

## 2018-01-01 RX ADMIN — IPRATROPIUM BROMIDE AND ALBUTEROL SULFATE 3 ML: .5; 3 SOLUTION RESPIRATORY (INHALATION) at 19:42

## 2018-01-01 RX ADMIN — GABAPENTIN 600 MG: 300 CAPSULE ORAL at 21:10

## 2018-01-01 RX ADMIN — SODIUM CHLORIDE 100 ML/HR: 9 INJECTION, SOLUTION INTRAVENOUS at 00:11

## 2018-01-01 RX ADMIN — HALOPERIDOL LACTATE 1 MG: 5 INJECTION, SOLUTION INTRAMUSCULAR at 04:21

## 2018-01-01 RX ADMIN — ONDANSETRON HYDROCHLORIDE 4 MG: 2 INJECTION, SOLUTION INTRAMUSCULAR; INTRAVENOUS at 16:02

## 2018-01-01 RX ADMIN — ONDANSETRON HYDROCHLORIDE 8 MG: 2 INJECTION, SOLUTION INTRAMUSCULAR; INTRAVENOUS at 15:16

## 2018-01-01 RX ADMIN — ONDANSETRON HYDROCHLORIDE 8 MG: 2 INJECTION, SOLUTION INTRAMUSCULAR; INTRAVENOUS at 21:57

## 2018-01-01 RX ADMIN — SALINE NASAL SPRAY 2 SPRAY: 1.5 SOLUTION NASAL at 20:48

## 2018-01-01 RX ADMIN — SODIUM CHLORIDE 100 ML/HR: 9 INJECTION, SOLUTION INTRAVENOUS at 16:01

## 2018-01-01 RX ADMIN — HYDROMORPHONE HYDROCHLORIDE 0.25 MG: 2 INJECTION INTRAMUSCULAR; INTRAVENOUS; SUBCUTANEOUS at 20:49

## 2018-01-01 RX ADMIN — GABAPENTIN 600 MG: 300 CAPSULE ORAL at 20:48

## 2018-01-01 RX ADMIN — IPRATROPIUM BROMIDE AND ALBUTEROL SULFATE 3 ML: .5; 3 SOLUTION RESPIRATORY (INHALATION) at 16:44

## 2018-01-01 RX ADMIN — IPRATROPIUM BROMIDE AND ALBUTEROL SULFATE 3 ML: .5; 3 SOLUTION RESPIRATORY (INHALATION) at 20:04

## 2018-01-01 RX ADMIN — ONDANSETRON HYDROCHLORIDE 4 MG: 2 INJECTION, SOLUTION INTRAMUSCULAR; INTRAVENOUS at 10:17

## 2018-01-01 RX ADMIN — SODIUM CHLORIDE 100 ML/HR: 9 INJECTION, SOLUTION INTRAVENOUS at 20:16

## 2018-01-01 RX ADMIN — HYDROMORPHONE HYDROCHLORIDE 0.5 MG: 2 INJECTION INTRAMUSCULAR; INTRAVENOUS; SUBCUTANEOUS at 13:35

## 2018-01-01 RX ADMIN — PANTOPRAZOLE SODIUM 40 MG: 40 INJECTION, POWDER, FOR SOLUTION INTRAVENOUS at 21:55

## 2018-01-01 RX ADMIN — GLYCOPYRROLATE 0.4 MG: 0.2 INJECTION, SOLUTION INTRAMUSCULAR; INTRAVENOUS at 17:27

## 2018-01-01 RX ADMIN — GLYCOPYRROLATE 0.4 MG: 0.2 INJECTION, SOLUTION INTRAMUSCULAR; INTRAVENOUS at 12:58

## 2018-01-01 RX ADMIN — PANTOPRAZOLE SODIUM 40 MG: 40 INJECTION, POWDER, FOR SOLUTION INTRAVENOUS at 07:54

## 2018-01-01 RX ADMIN — FAMOTIDINE 20 MG: 10 INJECTION, SOLUTION INTRAVENOUS at 20:05

## 2018-01-01 RX ADMIN — HYDROMORPHONE HYDROCHLORIDE 0.25 MG: 10 INJECTION, SOLUTION INTRAMUSCULAR; INTRAVENOUS; SUBCUTANEOUS at 18:34

## 2018-01-01 RX ADMIN — GABAPENTIN 600 MG: 300 CAPSULE ORAL at 00:06

## 2018-01-01 RX ADMIN — GABAPENTIN 600 MG: 300 CAPSULE ORAL at 08:15

## 2018-01-08 NOTE — TELEPHONE ENCOUNTER
Returned Karissa's phone call.  No answer.  I left message for her to return my call along with my office hours.

## 2018-01-08 NOTE — TELEPHONE ENCOUNTER
Karissa called and requested RF on promethazine for pt.  Per order, refills sent to her pharmacy.

## 2018-01-09 NOTE — TELEPHONE ENCOUNTER
Karissa called and wanted to know if it is ok for her mother to use glycerin suppositories to help with daily bowel movements and if it is ok for her to continue taking oral stool softeners daily.  I told her that is ok to use suppositories and take stool softener daily and to back off use if stools become loose.  She verbalized understanding.

## 2018-01-15 NOTE — TELEPHONE ENCOUNTER
Pt's daughter called and said pt is having increasing pain that is no longer well controlled with tramadol alone.  She requested prescription for stronger pain med. Dr. Lao wrote prescription for percocet 5/325mg.  Pt's daughter will  prescription today.

## 2018-01-17 NOTE — PROGRESS NOTES
Pt is going to increase her gabapentin dose by 300 mg.  She will call us when she needs a new prescription.

## 2018-01-24 NOTE — TELEPHONE ENCOUNTER
Pt's daughter Karissa called and said that pt has stomach virus.  Karissa had it yesterday and it lasted ~ 24 hours and the granddaughter had it before that.  Now Karine has the same symptoms.  She did vomit this morning but doesn't have anything left to vomit now and is very nauseated.  She has not taken any thing for nausea.  I advised she take promethazine now.  If she can not take oral medication it can be given rectally.  Karissa verbalized understanding and will call us back if pt's symptoms do not resolve.

## 2018-01-26 PROBLEM — C56.9 OVARIAN CA (HCC): Status: ACTIVE | Noted: 2018-01-01

## 2018-01-26 NOTE — TELEPHONE ENCOUNTER
Karissa called and said pt is no better from yesterday.  She thinks it is same stomach virus that everyone is her household has had but her mother is not recovering.  She can't eat or drink, is nauseated with occ vomiting.  I advised she take her to the ED.  She agreed and is bringing her our ED.

## 2018-01-26 NOTE — PROGRESS NOTES
Discharge Planning Assessment  [facility]     Patient Name: Karine Eduardo  MRN: [id number]  Today's Date: 1/26/2018    Admit Date: 1/26/2018          Discharge Needs Assessment       01/26/18 1634    Living Environment    Lives With alone    Living Arrangements house    Home Accessibility no concerns;stairs within home    Stair Railings at Home none    Type of Financial/Environmental Concern none    Transportation Available car;family or friend will provide    Living Environment    Provides Primary Care For no one    Primary Care Provided By child(wesley) (specify)    Quality Of Family Relationships supportive    Able to Return to Prior Living Arrangements yes    Discharge Needs Assessment    Concerns To Be Addressed denies needs/concerns at this time    Readmission Within The Last 30 Days no previous admission in last 30 days    Community Agency Name(S) Pt seeing Hospice nurse as CM was leaving room    Anticipated Changes Related to Illness inability to care for self    Equipment Currently Used at Home nebulizer;cane, straight;walker, standard;oxygen;respiratory supplies    Discharge Contact Information if Applicable Irma Kaiser, daughter, [phone]            Discharge Plan       01/26/18 1636    Case Management/Social Work Plan    Plan initial    Additional Comments Pt currently lives alone in [place]. She has metastatic ovarian CA and is weak. She is using a walker, O2 with neb machine from [facility]. Her family is supportive and assists when needed. Inpt hospice nurse is meeting with her here today. PCP is Michael Chester        Discharge Placement     No information found                Demographic Summary     None            Functional Status       01/26/18 1633    Functional Status Current    Ambulation 2-->assistive person    Transferring 2-->assistive person    Toileting 2-->assistive person    Eating 0-->independent    Communication 0-->understands/communicates without difficulty     Swallowing (if score 2 or more for any item, consult Rehab Services) 0-->swallows foods/liquids without difficulty    Change in Functional Status Since Onset of Current Illness/Injury yes    Functional Status Prior    Ambulation 1-->assistive equipment    Transferring 0-->independent    Toileting 0-->independent    Bathing 0-->independent    Dressing 0-->independent    Eating 0-->independent    Communication 0-->understands/communicates without difficulty    Swallowing 0-->swallows foods/liquids without difficulty    IADL    Medications assistive person    Meal Preparation assistive person    Housekeeping assistive person    Laundry assistive person    Shopping assistive person    Oral Care assistive person    Activity Tolerance    Current Activity Limitations none    Usual Activity Tolerance moderate    Current Activity Tolerance fair    Cognitive/Perceptual/Developmental    Current Mental Status/Cognitive Functioning no deficits noted    Recent Changes in Mental Status/Cognitive Functioning no changes    Developmental Stage (Eriksson's Stages of Development) Stage 8 (65 years-death/Late Adulthood) Integrity vs. Despair            Psychosocial     None            Abuse/Neglect     None            Legal     None            Substance Abuse     None            Patient Forms     None          Aurora Partida

## 2018-01-26 NOTE — PROGRESS NOTES
I stopped by to see patient in ER.  She called Hospice this week.  Notes family with GI bug, she didn't recover the way everyone else is recovering.  Notes abdominal pain, now better since she's at ER.    Nitza Saavedra MD  01/26/18  5:11 PM

## 2018-01-26 NOTE — ED PROVIDER NOTES
Subjective   HPI Comments: Mr. Karine Eduardo is a 72 y.o. female who presents to the ED with GI complaints. She notes of 3 days of N/V/D. The diarrhea is watery and mixed with some loose stools. She is not able to tell if there is blood in her BM. She has been more fatigued than usual, and has some generalized abdominal pain. She has no fevers, chills, rhinorrhea, coughs, congestion, or any other acute sx at this time. She has a hx of ovarian cancer. And is seeing Dr. Saavedra.    Patient is a 72 y.o. female presenting with GI illness.   History provided by:  Patient  GI Problem   The primary symptoms include fatigue, abdominal pain, nausea, vomiting and diarrhea. Primary symptoms do not include fever or dysuria. The illness began 3 to 5 days ago. The onset was sudden. The problem has been gradually worsening.   The illness does not include chills or constipation.       Review of Systems   Constitutional: Positive for fatigue. Negative for chills and fever.   Respiratory: Negative for shortness of breath.    Gastrointestinal: Positive for abdominal pain, diarrhea, nausea and vomiting. Negative for constipation.   Genitourinary: Negative for difficulty urinating, dysuria and hematuria.   Neurological: Positive for weakness (generalized).       Past Medical History:   Diagnosis Date   • Anxiety    • Arthritis    • CHF (congestive heart failure)    • Colitis    • COPD (chronic obstructive pulmonary disease)    • Depression    • Diverticulitis    • Diverticulosis    • Emphysema lung    • Fractures    • H/O bladder problems    • H/O CT scan of chest 06/09/2015    CTA of the chest 6/09/2015, large acute pulmonary emboli, bilateral. RV dilatation, but hemodynamically stable.   • H/O echocardiogram 06/09/2015    Est EF 55-60%. RV moderately dilated. All valves are structurally and functionally normal with no hemodynamically significant valve disease   • H/O: CVA (cerebrovascular accident)    • Heart murmur    •  History of blood clots 06/2015    IN LEFT LEG AND BILATERAL LUNGS, TREATED WITH XARELTO    • History of transfusion    • Hypercholesteremia    • Osteopenia    • Ovarian cancer 01/2015    15 CHEMO TREATMENTS. 6/9/2015: Patient due for cycle 6 of chemo today however will be held secondary to patient's current pulmonary status.   • Pneumonia    • PONV (postoperative nausea and vomiting)    • Pulmonary emboli     Bliateral. Patient is on heparin drip and is on oxygen to keep oxygen saturations greater than 92%. Causing acute shortness of air.   • Staph infection     IN BLOOD STREAM HOSPITALIZED FROM MAY 19-JUNE2,2016, TREATED WITH ABX-VANCOMYCIN   • Stroke 1973    MILD WEAKNESS OF LEFT SIDE. History of CVA in 1970s with residual left hand numbness.   • Trigeminal neuralgia    • Urinary incontinence    • Wears dentures    • Wears glasses    1:18 PM  Old records reviewed:  12/28/2017 Note from Dr. Saavedra:  History of present illness:  The patient is a 72 y.o. year old female with recurrent ovarian cancer who presents for follow-up and ongoing symptom management.     Today, patient reports her symptoms are stable.  She continues to use oxygen on a continuous basis.  She is in a wheelchair today.  She is accompanied by her daughter as usual.  She does note abdominal discomfort for which she takes 2 tramadol pills a day.  This controls her pain well.  She does not require refill today.  She has urinary complaints and notes pull ups stay wet.  She thinks she has another UTI and has some dysuria.      Oncologic History:           Malignant neoplasm of both ovaries     12/16/2014 Imaging       TVUS for PMB showed mildly enlarged uterus, 18 mm endometrial stripe, and 14 cm right adnexal mass         1/19/2015 Surgery       RTLH/BSO with infracolic omentectomy, peritoneal biopsies, and appendectomy. 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 Stage IC grade 1 by final path.          - 5/20/2015 Chemotherapy       Cycle #5 of Carboplatin and dose dense paclitaxel. Chemo course significant for bone marrow suppression requiring transfusion and GC SF, facial trigeminal neuropathy exacerbation, and pulmonary emboli.          6/22/2015 Genetic Testing       All genes on GYNplus Panel through [facility] negative         7/9/2015 Imaging       CT scan negative for disease         7/19/2016 Imaging       CT chest/abdomen/pelvis for new GI complaints. Abdomen/pelvis negative, however, discovery of ill-defined new pulmonary mass in left upper lobe. Note: patient had recent hospitalization for suspected pneumonia         8/16/2016 Biopsy       Left upper lobe transbronchial biopsy. Pathology consistent with recurrent mucinous adenocarcinoma.          9/26/2016 -  Radiation       Underwent CyberKnife x 1 treatment with Dr. Mariann Lomeli         4/13/2017 Imaging       IMPRESSION CT CHEST:  There has been significant progression of disease when  compared with 08/09/2016 specifically the left upper lobe mass noted on  the previous examination has trebled. There is also an increasing mass  in the right upper lobe, now measuring approximately 1.2 cm as well as a  mass in the right lower lobe which has also significantly increased in  size measuring 2.7 cm in size.  IMPRESSION CT ABDOMEN/PELVIS:  There is a 1.4 cm cystic mass adherent to the surface of the  inferomedial aspect of the right lobe of the liver. In retrospect this  was present on the previous examination of 08/01/2016, but not on  earlier examinations in 2015. Given the cystic nature of the original  neoplasm, this likely represents a small solitary metastasis.         5/4/2017 - 6/15/2017 Chemotherapy       Began Carbo/Taxotere every three weeks.  Underwent a Avastin with for cycle.  This is been held due to rectal bleeding.  Total of 3 cycles.  Interval imaging showed no change in disease  and chemotherapy was discontinued.           Ovarian cancer     8/9/2016 Initial Diagnosis       Ovarian cancer             Allergies   Allergen Reactions   • Bactrim [Sulfamethoxazole-Trimethoprim] Hives   • Penicillins Hives   • Prednisone Nausea And Vomiting     SWEATS    • Sulfa Antibiotics Hives       Past Surgical History:   Procedure Laterality Date   • APPENDECTOMY     • BLADDER SLING MODIFIED, ANTERIOR AND POSTERIOR VAGINAL REPAIR      PLACED IN 2-2013, REMOVED IN 7/2013 D/T DEFECTIVE MESH AND INFECTION. Suburethral mesh sling placement and subsequent excision due to infection.   • BREAST LUMPECTOMY Right    • BREAST SURGERY Right 06/2005    LUMPECTOMY    • BRONCHOSCOPY N/A 8/16/2016    Procedure: MAURICE BRONCHOSCOPY WITH FLUORO;  Surgeon: Henrry Leo MD;  Location: [facility] ENDOSCOPY;  Service:    • CERVICAL CONIZATION     • COLONOSCOPY     • CYSTOCELE REPAIR  1976, 8-2008,     rectocele repair   • HYSTERECTOMY     • LAPAROTOMY OOPHERECTOMY     • OMENTECTOMY      Infracolic   • OOPHORECTOMY Right    • PORTACATH PLACEMENT     • RECTAL MASS EXCISION      Mesh   • TEETH EXTRACTION     • TOTAL ABDOMINAL HYSTERECTOMY WITH SALPINGO OOPHORECTOMY     • TUBAL ABDOMINAL LIGATION  1976       Family History   Problem Relation Age of Onset   • Kidney disease Mother    • Other Mother      heart attack   • Colon cancer Father        Social History     Social History   • Marital status:      Spouse name: N/A   • Number of children: N/A   • Years of education: N/A     Occupational History   • Retired      Social History Main Topics   • Smoking status: Former Smoker     Types: Cigarettes     Quit date: 1997   • Smokeless tobacco: Never Used      Comment: Smoked for 20 years and smoked more than 1 PPD.   • Alcohol use No   • Drug use: No   • Sexual activity: No     Other Topics Concern   • None     Social History Narrative         Objective   Physical Exam   Constitutional: She is oriented to person, place,  and time. She appears well-developed and well-nourished. No distress.   Looked like she did not feel well. Moaning in pain.    HENT:   Head: Normocephalic and atraumatic.   Nose: Nose normal.   Mouth/Throat: Mucous membranes are dry.   Sunken eyes   Eyes: Conjunctivae are normal. No scleral icterus.   Neck: Normal range of motion. Neck supple.   Cardiovascular: Normal rate, regular rhythm, normal heart sounds and intact distal pulses.    No murmur heard.  Pulmonary/Chest: Effort normal and breath sounds normal. No respiratory distress.   Abdominal: Soft. She exhibits distension. There is tenderness (Mill diffuse).   Positive bowel sounds.   Musculoskeletal: Normal range of motion. She exhibits no edema.   Equal pulses, no synovitis, mass or rash in extremities.   Lymphadenopathy:     She has no cervical adenopathy.     She has no axillary adenopathy.   Neurological: She is alert and oriented to person, place, and time.   Mild globalized weakness   Skin: Skin is warm and dry. She is not diaphoretic.   Psychiatric: She has a normal mood and affect. Her behavior is normal.   Nursing note and vitals reviewed.      Procedures         ED Course  ED Course   Value Comment By Time   Anion Gap: 9.0 (Reviewed) Dudley Gandhi MD 01/26 7801    I spoke with the hospice nurse and they're going to admit the patient directly to their service.  Her admission is unchanged. Dudley Gandhi MD 01/26 8991     Recent Results (from the past 24 hour(s))   Carbamazepine Level, Total    Collection Time: 01/26/18 12:17 PM   Result Value Ref Range    Carbamazepine Level <0.3 (L) 4.0 - 10.0 mcg/mL   Comprehensive Metabolic Panel    Collection Time: 01/26/18 12:17 PM   Result Value Ref Range    Glucose 102 (H) 70 - 100 mg/dL    BUN 20 9 - 23 mg/dL    Creatinine 0.80 0.60 - 1.30 mg/dL    Sodium 135 132 - 146 mmol/L    Potassium 3.8 3.5 - 5.5 mmol/L    Chloride 99 99 - 109 mmol/L    CO2 27.0 20.0 - 31.0 mmol/L    Calcium 8.6 (L) 8.7 - 10.4  mg/dL    Total Protein 7.3 5.7 - 8.2 g/dL    Albumin 3.60 3.20 - 4.80 g/dL    ALT (SGPT) 106 (H) 7 - 40 U/L    AST (SGOT) 104 (H) 0 - 33 U/L    Alkaline Phosphatase 223 (H) 25 - 100 U/L    Total Bilirubin 1.2 0.3 - 1.2 mg/dL    eGFR Non African Amer 70 >60 mL/min/1.73    Globulin 3.7 gm/dL    A/G Ratio 1.0 (L) 1.5 - 2.5 g/dL    BUN/Creatinine Ratio 25.0 7.0 - 25.0    Anion Gap 9.0 3.0 - 11.0 mmol/L   Lipase    Collection Time: 01/26/18 12:17 PM   Result Value Ref Range    Lipase 78 (H) 6 - 51 U/L   Light Blue Top    Collection Time: 01/26/18 12:17 PM   Result Value Ref Range    Extra Tube hold for add-on    Green Top (Gel)    Collection Time: 01/26/18 12:17 PM   Result Value Ref Range    Extra Tube Hold for add-ons.    Lavender Top    Collection Time: 01/26/18 12:17 PM   Result Value Ref Range    Extra Tube hold for add-on    Gold Top - SST    Collection Time: 01/26/18 12:17 PM   Result Value Ref Range    Extra Tube Hold for add-ons.    CBC Auto Differential    Collection Time: 01/26/18 12:17 PM   Result Value Ref Range    WBC 9.89 3.50 - 10.80 10*3/mm3    RBC 3.44 (L) 3.89 - 5.14 10*6/mm3    Hemoglobin 10.4 (L) 11.5 - 15.5 g/dL    Hematocrit 33.5 (L) 34.5 - 44.0 %    MCV 97.4 80.0 - 99.0 fL    MCH 30.2 27.0 - 31.0 pg    MCHC 31.0 (L) 32.0 - 36.0 g/dL    RDW 16.7 (H) 11.3 - 14.5 %    RDW-SD 59.5 (H) 37.0 - 54.0 fl    MPV 9.6 6.0 - 12.0 fL    Platelets 319 150 - 450 10*3/mm3    Neutrophil % 81.8 (H) 41.0 - 71.0 %    Lymphocyte % 7.7 (L) 24.0 - 44.0 %    Monocyte % 9.8 0.0 - 12.0 %    Eosinophil % 0.3 0.0 - 3.0 %    Basophil % 0.1 0.0 - 1.0 %    Immature Grans % 0.3 0.0 - 0.6 %    Neutrophils, Absolute 8.09 1.50 - 8.30 10*3/mm3    Lymphocytes, Absolute 0.76 0.60 - 4.80 10*3/mm3    Monocytes, Absolute 0.97 0.00 - 1.00 10*3/mm3    Eosinophils, Absolute 0.03 0.00 - 0.30 10*3/mm3    Basophils, Absolute 0.01 0.00 - 0.20 10*3/mm3    Immature Grans, Absolute 0.03 0.00 - 0.03 10*3/mm3   POC Troponin, Rapid    Collection Time:  01/26/18 12:22 PM   Result Value Ref Range    Troponin I 0.01 0.00 - 0.07 ng/mL   Lactic Acid, Plasma    Collection Time: 01/26/18 12:26 PM   Result Value Ref Range    Lactate 0.6 0.5 - 2.0 mmol/L   Urinalysis With / Culture If Indicated - Urine, Catheter    Collection Time: 01/26/18  1:24 PM   Result Value Ref Range    Color, UA Dark Yellow (A) Yellow, Straw    Appearance, UA Cloudy (A) Clear    pH, UA 5.5 5.0 - 8.0    Specific Gravity, UA 1.022 1.001 - 1.030    Glucose, UA Negative Negative    Ketones, UA 15 mg/dL (1+) (A) Negative    Bilirubin, UA Small (1+) (A) Negative    Blood, UA Negative Negative    Protein, UA 30 mg/dL (1+) (A) Negative    Leuk Esterase, UA Small (1+) (A) Negative    Nitrite, UA Positive (A) Negative    Urobilinogen, UA 1.0 E.U./dL 0.2 - 1.0 E.U./dL   Urinalysis, Microscopic Only - Urine, Clean Catch    Collection Time: 01/26/18  1:24 PM   Result Value Ref Range    RBC, UA None Seen None Seen, 0-2 /HPF    WBC, UA 6-12 (A) None Seen /HPF    Bacteria, UA 4+ (A) None Seen, Trace /HPF    Squamous Epithelial Cells, UA 7-12 (A) None Seen, 0-2 /HPF    Hyaline Casts, UA 0-6 0 - 6 /LPF    Methodology Manual Light Microscopy      Note: In addition to lab results from this visit, the labs listed above may include labs taken at another facility or during a different encounter within the last 24 hours. Please correlate lab times with ED admission and discharge times for further clarification of the services performed during this visit.    CT Chest Without Contrast   Final Result   The patient has widespread ominous disease throughout the   lungs and pleural spaces as described. There are large bilateral   coalescent mass lesions, pulmonary nodules, fibrotic stranding,   bronchiectasis, COPD, centrilobular emphysema and a sizable left   effusion. These findings have not changed or improved when compared to   studies of 12/28/2017 and remain quite ominous. There is volume loss   left chest with elevation  of left hemidiaphragm. No interval changes or   improvements are noted.       D:  01/26/2018   E:  01/26/2018               This report was finalized on 1/26/2018 4:16 PM by Dr. Aurelio Sanchez MD.          CT Abdomen Pelvis Without Contrast   Final Result   1.  Four chamber cardiomegaly, large hiatus hernia in the lower chest,   volume loss left base with elevation of left hemidiaphragm.   2.  Marked linear nodular caking and stranding seen diffusely throughout   the retroperitoneum, mesentery and omentum.   3.  Ascites over the spleen. Ascites in the flanks. Evidence of   impending mechanical bowel obstruction with substantial dilatation of   the small bowel, third space fluid loss in the bowel and marked air   fluid levels throughout the bowel. The ascites and bowel obstruction   have progressed over the interval from slightly less than one month ago   previous data sets.   4.  All other findings including collections around the liver, this   severe caking, and the marked diffuse stranding are stable. Overall,   there is evidence of progressive disease.       D:  01/26/2018   E:  01/26/2018               This report was finalized on 1/26/2018 4:16 PM by Dr. Aurelio Sanchez MD.            Vitals:    01/26/18 1305 01/26/18 1341 01/26/18 1455 01/26/18 1600   BP:   117/69 119/72   BP Location:   Left arm    Patient Position:   Lying    Pulse: 95 90 85 92   Resp:  16 14    Temp:       TempSrc:       SpO2: 99% 99% 96% 96%   Weight:       Height:         Medications   sodium chloride 0.9 % bolus 1,000 mL (0 mL Intravenous Stopped 1/26/18 1454)   ondansetron (ZOFRAN) injection 4 mg (4 mg Intravenous Given 1/26/18 1456)     ECG/EMG Results (last 24 hours)     Procedure Component Value Units Date/Time    ECG 12 Lead [[id number]] Collected:  01/26/18 1214     Updated:  01/26/18 1348    Narrative:       Test Reason : weakness  Blood Pressure : **/** mmHG  Vent. Rate : 097 BPM     Atrial Rate : 097 BPM     P-R Int : 164 ms           QRS Dur : 086 ms      QT Int : 414 ms       P-R-T Axes : 011 004 033 degrees     QTc Int : 525 ms    Sinus rhythm with frequent premature ventricular complexes  Possible Left atrial enlargement  Prolonged QT  Abnormal ECG  When compared with ECG of 10-AUG-2016 12:23,  premature ventricular complexes are now present  QT has lengthened  Confirmed by TYRON CARDOZA MD ([id number]) on 1/26/2018 1:48:45 PM    Referred By:  edmd           Confirmed By:TYRON CARDOZA MD                          MDM  Number of Diagnoses or Management Options  Dehydration, moderate:   Metastatic cancer:   Partial intestinal obstruction, unspecified cause:   Diagnosis management comments:       I reviewed all available studies at the bedside with the patient and her daughter.    Unfortunately it seems she's had progression of her metastatic ovarian cancer.  She now has worsening ascites and caking of her omentum and probably impending bowel obstruction from this.    Unfortunately from Dr. Saavedra's note it doesn't sound like she's been to be amenable to any other sort of intervention.    I called and left a note with Dr. Saavedra who is currently scrubbed in surgery.    The patient and her daughter back have made an appointment to see hospice this coming week and I think this is appropriate. They are wanting to switch from treatment to a more palliative approach.  I'm in agreement with this.  We will give her IV fluids and pain and nausea medicine.  At this point we discussed NG tube and they don't not wish and I think this is quite reasonable.    I spoke with Dr. Locke, on-call hospital medicine, and he will admit the patient.    All are agreeable with the plan       Amount and/or Complexity of Data Reviewed  Decide to obtain previous medical records or to obtain history from someone other than the patient: yes        Final diagnoses:   Metastatic cancer   Partial intestinal obstruction, unspecified cause   Dehydration, moderate   EMR  Dragon/Transcription disclaimer:  Much of this encounter note is an electronic transcription/translation of spoken language to printed text. The electronic translation of spoken language may permit erroneous, or at times, nonsensical words or phrases to be inadvertently transcribed; Although I have reviewed the note for such errors, some may still exist.      Documentation assistance provided by wali [email or web address].  Information recorded by the scribe was done at my direction and has been verified and validated by me.     Gatito Cuevas  01/26/18 1336       Gatito Cuevas  01/26/18 1544       Dudley Gandhi MD  01/27/18 0953

## 2018-01-27 PROBLEM — E86.0 DEHYDRATION: Status: ACTIVE | Noted: 2018-01-01

## 2018-02-08 ENCOUNTER — APPOINTMENT (OUTPATIENT)
Dept: ONCOLOGY | Facility: HOSPITAL | Age: 73
End: 2018-02-08

## 2018-10-08 ENCOUNTER — CLINICAL SUPPORT NO REQUIREMENTS (OUTPATIENT)
Dept: GYNECOLOGIC ONCOLOGY | Facility: CLINIC | Age: 73
End: 2018-10-08

## 2018-10-08 DIAGNOSIS — C56.3 MALIGNANT NEOPLASM OF BOTH OVARIES (HCC): Primary | ICD-10-CM

## 2021-08-08 NOTE — TELEPHONE ENCOUNTER
Called the patient to tell her the results of her PET scan.  Overall I think it looks very good.  The radiologist made the comment that the area of concern looked larger but we treat the lung masses with a margin so this is typical of the reading this soon after treatment.  There is also mention of a right lung mass that was very small.  It's not hypermetabolic at this time so it's something else we can follow.  Mrs. Eduardo was happy with the report as am I.  She sees Dr. Saavedra next week  
Left arm;

## 2025-05-07 NOTE — TELEPHONE ENCOUNTER
Karissa called and had questions regarding bowel management.  We had a detailed discussion on bowel regimen of miralax and stool softener.  Pt's abdominal / pelvic pain seems to be getting worse.  She is taking one tramadol daily for this and she does get relief with tramadol.  I told Karissa she may increase frequency of taking - to follow directions on the bottle as needed.  We briefly discussed Hospice.  Pt continues to decline the need for Hospice.  Karissa will call our office back if pt decides she needs to come in for a visit.  Karissa verbalized understanding of all discussed.   
normal...